# Patient Record
Sex: FEMALE | Race: WHITE | Employment: OTHER | URBAN - METROPOLITAN AREA
[De-identification: names, ages, dates, MRNs, and addresses within clinical notes are randomized per-mention and may not be internally consistent; named-entity substitution may affect disease eponyms.]

---

## 2018-01-08 ENCOUNTER — OFFICE VISIT (OUTPATIENT)
Dept: INTERNAL MEDICINE CLINIC | Age: 68
End: 2018-01-08

## 2018-01-08 VITALS
BODY MASS INDEX: 29.34 KG/M2 | HEIGHT: 72 IN | OXYGEN SATURATION: 96 % | TEMPERATURE: 98.3 F | HEART RATE: 56 BPM | DIASTOLIC BLOOD PRESSURE: 70 MMHG | SYSTOLIC BLOOD PRESSURE: 150 MMHG | RESPIRATION RATE: 20 BRPM | WEIGHT: 216.6 LBS

## 2018-01-08 DIAGNOSIS — I48.19 PERSISTENT ATRIAL FIBRILLATION (HCC): Primary | ICD-10-CM

## 2018-01-08 DIAGNOSIS — Z12.11 COLON CANCER SCREENING: ICD-10-CM

## 2018-01-08 DIAGNOSIS — I10 ESSENTIAL HYPERTENSION: ICD-10-CM

## 2018-01-08 DIAGNOSIS — Z13.220 LIPID SCREENING: ICD-10-CM

## 2018-01-08 DIAGNOSIS — Z11.59 NEED FOR HEPATITIS C SCREENING TEST: ICD-10-CM

## 2018-01-08 NOTE — PROGRESS NOTES
New Patient Evaluation    Maeve Nuñez is a 69 y.o. female. They are here to establish care with the group and me as a primary care provider. she has seen Dr. Jane Soliman in the past (in [place]). The last visit was in February of 2017     She has seen Dr. Jose Shah for what sounds like a PFO. She had a closure device placed in 2009. She was discovered to be in Afib some years later. After two attempts at cardioversion and attempts to medically manage this--she was placed on Eliquis. Stable on this medication. She has white coat syndrome--pressure elevated today. Her GI physician is Dr. Cassi Glass. She has seen him on Sept 25th. She had an issue with diarrhea. Treated by urgent care with cipro/Flagyl. This resolved and has not returned. She had a colonoscopy March of 2014. Had some polyps--5 year follow up. Bone denisity- Normal.   Mammogram last Dec '17 and normal.  Has dense breasts. No physical since May of 2016    Patient Active Problem List    Diagnosis Date Noted    Persistent atrial fibrillation ([address] 01/08/2018    Essential hypertension 01/08/2018     Current Outpatient Prescriptions   Medication Sig Dispense Refill    metoprolol succinate (TOPROL-XL) 50 mg XL tablet Take 1 Tab by mouth daily. 30 Tab 11    losartan (COZAAR) 100 mg tablet Take 100 mg by mouth daily.  apixaban (ELIQUIS) 5 mg tablet Take 5 mg by mouth two (2) times a day. Allergies   Allergen Reactions    Amoxicillin Hives    Doxycycline Rash     No past medical history on file. 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      HX TONSILLECTOMY       Family History   Problem Relation Age of Onset    Hypertension Mother     Alzheimer Mother     Prostate Cancer Father     Diabetes Father     Hypertension Father     Diabetes Sister      typpe 1    Prostate Cancer Brother  Hypertension Brother     Alzheimer Brother      Social History   Substance Use Topics    Smoking status: Former Smoker     Quit date: 1980    Smokeless tobacco: Never Used    Alcohol use Yes      Comment: 7        Health Maintenance   Topic Date Due    Hepatitis C Screening  1950    DTaP/Tdap/Td series (1 - Tdap) 09/05/1969    BREAST CANCER SCRN MAMMOGRAM  09/05/1998    FOBT Q 1 YEAR AGE 50-75  09/05/1998    ZOSTER VACCINE AGE 60>  07/05/2008    GLAUCOMA SCREENING Q2Y  09/05/2013    OSTEOPOROSIS SCREENING (DEXA)  09/05/2013    Pneumococcal 65+ Low/Medium Risk (1 of 2 - PCV13) 09/05/2013    MEDICARE YEARLY EXAM  09/05/2013    Influenza Age 9 to Adult  08/01/2017       Review of Systems   Constitutional: Negative. Respiratory: Negative. Cardiovascular: Negative. Gastrointestinal: Negative. Genitourinary: Negative. Visit Vitals    /70 (BP 1 Location: Right arm, BP Patient Position: Sitting)    Pulse (!) 56    Temp 98.3 °F (36.8 °C) (Oral)    Resp 20    Ht 6' 1\" (1.854 m)    Wt 216 lb 9.6 oz (98.2 kg)    SpO2 96%    BMI 28.58 kg/m2       Physical Exam   Constitutional: She is oriented to person, place, and time. No distress. Neurological: She is alert and oriented to person, place, and time. ASSESSMENT/PLAN    Diagnoses and all orders for this visit:    1. Persistent atrial fibrillation ([address]    2. Colon cancer screening  -     OCCULT BLOOD, IMMUNOASSAY (FIT)    3. Essential hypertension  -     CBC WITH AUTOMATED DIFF  -     METABOLIC PANEL, COMPREHENSIVE    4. Lipid screening  -     LIPID PANEL    5. Need for hepatitis C screening test  -     HEPATITIS C AB      Follow-up Disposition:  Return in about 7 weeks (around 2/26/2018) for Medicare Wellness Visit- 30 minute appointment.    -Discussed with the patient to continue the current plan of care. We will obtain baseline labwork and determine if any adjustments need to be done.   We will also await the records of the previous PCP to ascertain further details of the patient's history. The patient agrees with and understands the plan of care. All questions have been answered.

## 2018-02-21 ENCOUNTER — HOSPITAL ENCOUNTER (OUTPATIENT)
Dept: LAB | Age: 68
Discharge: HOME | End: 2018-02-21
Payer: MEDICARE

## 2018-02-21 PROCEDURE — 36415 COLL VENOUS BLD VENIPUNCTURE: CPT

## 2018-02-21 PROCEDURE — 85025 COMPLETE CBC W/AUTO DIFF WBC: CPT

## 2018-02-21 PROCEDURE — 86803 HEPATITIS C AB TEST: CPT

## 2018-02-21 PROCEDURE — 80061 LIPID PANEL: CPT

## 2018-02-21 PROCEDURE — 80053 COMPREHEN METABOLIC PANEL: CPT

## 2018-02-21 PROCEDURE — 82270 OCCULT BLOOD FECES: CPT

## 2018-02-22 LAB
ALBUMIN SERPL-MCNC: 4.2 G/DL (ref 3.6–4.8)
ALBUMIN/GLOB SERPL: 1.8 {RATIO} (ref 1.2–2.2)
ALP SERPL-CCNC: 74 IU/L (ref 39–117)
ALT SERPL-CCNC: 13 IU/L (ref 0–32)
AST SERPL-CCNC: 18 IU/L (ref 0–40)
BASOPHILS # BLD AUTO: 0 X10E3/UL (ref 0–0.2)
BASOPHILS NFR BLD AUTO: 0 %
BILIRUB SERPL-MCNC: 1.1 MG/DL (ref 0–1.2)
BUN SERPL-MCNC: 19 MG/DL (ref 8–27)
BUN/CREAT SERPL: 20 (ref 12–28)
CALCIUM SERPL-MCNC: 9.5 MG/DL (ref 8.7–10.3)
CHLORIDE SERPL-SCNC: 100 MMOL/L (ref 96–106)
CHOLEST SERPL-MCNC: 184 MG/DL (ref 100–199)
CO2 SERPL-SCNC: 23 MMOL/L (ref 18–29)
CREAT SERPL-MCNC: 0.94 MG/DL (ref 0.57–1)
EOSINOPHIL # BLD AUTO: 0 X10E3/UL (ref 0–0.4)
EOSINOPHIL NFR BLD AUTO: 0 %
ERYTHROCYTE [DISTWIDTH] IN BLOOD BY AUTOMATED COUNT: 13.5 % (ref 12.3–15.4)
GFR SERPLBLD CREATININE-BSD FMLA CKD-EPI: 62 ML/MIN/{1.73_M2}
GFR SERPLBLD CREATININE-BSD FMLA CKD-EPI: 72 ML/MIN/{1.73_M2}
GLOBULIN SER CALC-MCNC: 2.4 G/L (ref 1.5–4.5)
GLUCOSE SERPL-MCNC: 102 MG/DL (ref 65–99)
HCT VFR BLD AUTO: 43.5 % (ref 34–46.6)
HCV AB S/CO SERPL IA: <0.1 S/CO RATIO (ref 0–0.9)
HDLC SERPL-MCNC: 55 MG/DL
HGB BLD-MCNC: 14.7 G/DL (ref 11.1–15.9)
IMM GRANULOCYTES # BLD: 0 X10E3/UL (ref 0–0.1)
IMM GRANULOCYTES NFR BLD: 0 %
LDLC SERPL CALC-MCNC: 108 MG/DL (ref 0–99)
LYMPHOCYTES # BLD AUTO: 1 X10E3/UL (ref 0.7–3.1)
LYMPHOCYTES NFR BLD AUTO: 12 %
MCH RBC QN AUTO: 30.8 PG (ref 26.6–33)
MCHC RBC AUTO-ENTMCNC: 33.8 G/DL (ref 31.5–35.7)
MCV RBC AUTO: 91 FL (ref 79–97)
MONOCYTES # BLD AUTO: 0.5 X10E3/UL (ref 0.1–0.9)
MONOCYTES NFR BLD AUTO: 6 %
NEUTROPHILS # BLD AUTO: 6.9 X10E3/UL (ref 1.4–7)
NEUTROPHILS NFR BLD AUTO: 82 %
PLATELET # BLD AUTO: 180 X10E3/UL (ref 150–379)
POTASSIUM SERPL-SCNC: 4.5 MMOL/L (ref 3.5–5.2)
PROT SERPL-MCNC: 6.6 G/DL (ref 6–8.5)
RBC # BLD AUTO: 4.78 X10E6/UL (ref 3.77–5.28)
SODIUM SERPL-SCNC: 143 MMOL/L (ref 134–144)
TRIGL SERPL-MCNC: 106 MG/DL (ref 0–149)
VLDLC SERPL CALC-MCNC: 21 MG/DL (ref 5–40)
WBC # BLD AUTO: 8.4 X10E3/UL (ref 3.4–10.8)

## 2018-02-22 NOTE — PROGRESS NOTES
Please inform Ms. Royer Collier that her labs are normal with exception of a very mild elevation of cholesterol. Nothing needs to be done further regarding this.

## 2018-02-23 LAB — HEMOCCULT STL QL IA: POSITIVE

## 2018-02-28 ENCOUNTER — OFFICE VISIT (OUTPATIENT)
Dept: INTERNAL MEDICINE CLINIC | Age: 68
End: 2018-02-28

## 2018-02-28 VITALS
HEIGHT: 72 IN | SYSTOLIC BLOOD PRESSURE: 165 MMHG | TEMPERATURE: 98.5 F | OXYGEN SATURATION: 98 % | WEIGHT: 217.2 LBS | RESPIRATION RATE: 20 BRPM | BODY MASS INDEX: 29.42 KG/M2 | DIASTOLIC BLOOD PRESSURE: 80 MMHG | HEART RATE: 107 BPM

## 2018-02-28 DIAGNOSIS — Z13.39 SCREENING FOR ALCOHOLISM: ICD-10-CM

## 2018-02-28 DIAGNOSIS — Z23 NEED FOR TDAP VACCINATION: ICD-10-CM

## 2018-02-28 DIAGNOSIS — Z00.00 INITIAL MEDICARE ANNUAL WELLNESS VISIT: Primary | ICD-10-CM

## 2018-02-28 DIAGNOSIS — Z13.31 SCREENING FOR DEPRESSION: ICD-10-CM

## 2018-02-28 NOTE — PROGRESS NOTES
Dr. Harris Manriquez referred BB, 1950, a 69 y.o. female for a Medicare Annual Wellness Visit (AWV). This is an Initial Medicare Annual Wellness Exam (AWV) (Performed 12 months after IPPE or effective date of Medicare Part B enrollment, Once in a lifetime)    I have reviewed the patient's medical history in detail and updated the computerized patient record. 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  HX TONSILLECTOMY       Current Outpatient Prescriptions   Medication Sig Dispense Refill    metoprolol succinate (TOPROL-XL) 50 mg XL tablet Take 1 Tab by mouth daily. 30 Tab 11    losartan (COZAAR) 100 mg tablet Take 100 mg by mouth daily.  apixaban (ELIQUIS) 5 mg tablet Take 5 mg by mouth two (2) times a day. Allergies   Allergen Reactions    Amoxicillin Hives    Doxycycline Rash     Family History   Problem Relation Age of Onset    Hypertension Mother     Alzheimer Mother     Prostate Cancer Father     Diabetes Father     Hypertension Father     Diabetes Sister      typpe 1    Prostate Cancer Brother     Hypertension Brother     Alzheimer Brother      Social History   Substance Use Topics    Smoking status: Former Smoker     Quit date: 1980    Smokeless tobacco: Never Used    Alcohol use No     Patient Active Problem List   Diagnosis Code    Persistent atrial fibrillation (HCC) I48.1    Essential hypertension I10       Depression Risk Factor Screening:     PHQ over the last two weeks 1/8/2018   Little interest or pleasure in doing things Not at all   Feeling down, depressed or hopeless Not at all   Total Score PHQ 2 0     Alcohol Risk Factor Screening: You do not drink alcohol or very rarely.     Functional Ability and Level of Safety:     Hearing Loss  Hearing is good.    Activities of Daily Living  The home contains: no safety equipment. Patient does total self care     ADL Assessment 2/28/2018   Feeding yourself No Help Needed   Getting from bed to chair No Help Needed   Getting dressed No Help Needed   Bathing or showering No Help Needed   Walk across the room (includes cane/walker) No Help Needed   Using the telphone No Help Needed   Taking your medications No Help Needed   Preparing meals No Help Needed   Managing money (expenses/bills) No Help Needed   Moderately strenuous housework (laundry) No Help Needed   Shopping for personal items (toiletries/medicines) No Help Needed   Shopping for groceries No Help Needed   Driving No Help Needed   Climbing a flight of stairs No Help Needed   Getting to places beyond walking distances No Help Needed       Fall Risk  Fall Risk Assessment, last 12 mths 2/28/2018   Able to walk? Yes   Fall in past 12 months? No       Abuse Screen  Patient is not abused    Cognitive Screening   Evaluation of Cognitive Function:  Has your family/caregiver stated any concerns about your memory: no    Patient Care Team   Patient Care Team:  Dione Leventhal, MD as PCP - General (Internal Medicine)  Jeovanny Hurst MD (Cardiology)  Patito Romero MD (Ophthalmology)  Kaitlynn Dow MD (Gastroenterology)    Assessment/Plan   Education and counseling provided:  Are appropriate based on today's review and evaluation  End-of-Life planning (with patient's consent)  Pneumococcal Vaccine  Influenza Vaccine  Screening Mammography  Colorectal cancer screening tests  Screening for glaucoma  Diabetes screening test   Shingrix  Tdap    Diagnoses and all orders for this visit:    1. Initial Medicare annual wellness visit    2. Screening for alcoholism    3. Screening for depression     Patient did not bring in their medications to the visit.   During the patient interview,  the following was noted:  Patient is unsure if she has allergy to both amoxicillin and doxycycline. She was placed on amoxicillin for several weeks by periodontist.  She took without any problems. Doxycycline was then added and patient developed rash/hives. Screenings (see patient instructions for chart):  Mammogram: done 12/1/17, due for repeat in 10/2018  Colonoscopy: done March 2014 per patient with 5 yr follow-up. FIT done on 2/21/18 with positive result. Dr. Feroz Zee to discuss with patient. Glaucoma screening/Eye exam: done 10/10/17 due for repeat in  DEXA scan: patient states had this done and was normal  Lipid panel: done 2/21/18  Diabetes: done 2/21/18    Immunizations:  Pneumococcal:  Completed, called [facility] to confirm dates  Influenza:  Patient declines  Zoster:  Received Zostavax called [facility] to confirm date. Discussed recommendations for Shingrix  Tdap:  Recommended that patient receive at their pharmacy.     Advanced Care Planning:  Patient brought with her today      Brynda Fleischer, PharmD, [address] [place] [address] Dr. Maintenance Due   Topic Date Due    DTaP/Tdap/Td series (1 - Tdap) 09/05/1969    OSTEOPOROSIS SCREENING (DEXA)  09/05/2013    MEDICARE YEARLY EXAM  09/05/2013

## 2018-02-28 NOTE — MR AVS SNAPSHOT
[address] [place] [address] 
[phone] Patient: Cecilio Wallace MRN: [id number] Visit Information Date & Time Provider Department Dept. Phone Encounter #  
 2/28/2018  2:15 PM Dione Leventhal, MD [facility] Internal Medicine [phone] [id number] Upcoming Health Maintenance Date Due DTaP/Tdap/Td series (1 - Tdap) 9/5/1969 OSTEOPOROSIS SCREENING (DEXA) 9/5/2013 FOBT Q 1 YEAR AGE 50-75 2/21/2019 MEDICARE YEARLY EXAM 3/1/2019 GLAUCOMA SCREENING Q2Y 10/10/2019 BREAST CANCER SCRN MAMMOGRAM 12/1/2019 Allergies as of 2/28/2018  Review Complete On: 2/28/2018 By: Payal Hansen PHARMD  
  
 Severity Noted Reaction Type Reactions Amoxicillin  01/08/2018    Hives Doxycycline  06/01/2016    Rash Current Immunizations  Reviewed on 2/28/2018 Name Date Pneumococcal Conjugate (PCV-13) 6/5/2015 Pneumococcal Polysaccharide (PPSV-23) 6/27/2016 Zoster Vaccine, Live 5/29/2015 Reviewed by Payal Hansen PHARMD on 2/28/2018 at  2:03 PM  
You Were Diagnosed With   
  
 Codes Comments Need for Tdap vaccination    -  Primary ICD-10-CM: S60 ICD-9-CM: V06.1 Initial Medicare annual wellness visit     ICD-10-CM: Z00.00 ICD-9-CM: V70.0 Screening for alcoholism     ICD-10-CM: Z13.89 ICD-9-CM: V79.1 Screening for depression     ICD-10-CM: Z13.89 ICD-9-CM: V79.0 Vitals BP Pulse Temp Resp Height(growth percentile) Weight(growth percentile) 165/80 (!) 107 98.5 °F (36.9 °C) (Oral) 20 6' 1\" (1.854 m) 217 lb 3.2 oz (98.5 kg) SpO2 BMI OB Status Smoking Status 98% 28.66 kg/m2 Postmenopausal Former Smoker Vitals History BMI and BSA Data Body Mass Index Body Surface Area  
 28.66 kg/m 2 2.25 m 2 Preferred Pharmacy Pharmacy Name Phone  [address] [phone] Your Updated Medication List  
  
   
This list is accurate as of 18  3:06 PM.  Always use your most recent med list.  
  
  
  
  
 apixaban 5 mg tablet Commonly known as:  [place] Arm Take 5 mg by mouth two (2) times a day. diph,pertuss(acel),tetanus vac(PF) 2 Lf-(2.5-5-3-5 mcg)-5Lf/0.5 mL Syrg vaccine Commonly known as:  ADACEL(TDAP ADOLESN/ADULT)(PF)  
0.5 mL by IntraMUSCular route once for 1 dose. losartan 100 mg tablet Commonly known as:  COZAAR Take 100 mg by mouth daily. metoprolol succinate 50 mg XL tablet Commonly known as:  TOPROL-XL Take 1 Tab by mouth daily. Prescriptions Printed Refills diph,pertuss,acel,,tetanus vac,PF, (ADACEL,TDAP ADOLESN/ADULT,,PF,) 2 Lf-(2.5-5-3-5 mcg)-5Lf/0.5 mL syrg vaccine 0 Si.5 mL by IntraMUSCular route once for 1 dose. Class: Print Route: IntraMUSCular Patient Instructions Medicare Wellness Visit, Female The best way to live healthy is to have a healthy lifestyle by eating a well-balanced diet, exercising regularly, limiting alcohol and stopping smoking. Regular physical exams and screening tests are another way to keep healthy. Preventive exams provided by your health care provider can find health problems before they become diseases or illnesses. Preventive services including immunizations, screening tests, monitoring and exams can help you take care of your own health. All people over age 72 should have a pneumovax  and and a prevnar shot to prevent pneumonia. These are once in a lifetime unless you and your provider decide differently. All people over 65 should have a yearly flu shot and a tetanus vaccine every 10 years. A bone mass density to screen for osteoporosis or thinning of the bones should be done every 2 years after 65. Screening for diabetes mellitus with a blood sugar test should be done every year. Glaucoma is a disease of the eye due to increased ocular pressure that can lead to blindness and it should be done every year by an eye professional. 
 
Cardiovascular screening tests that check for elevated lipids (fatty part of blood) which can lead to heart disease and strokes should be done every 5 years. Colorectal screening that evaluates for blood or polyps in your colon should be done yearly as a stool test or every five years as a flexible sigmoidoscope or every 10 years as a colonoscopy up to age 76. Breast cancer screening with a mammogram is recommended biennially  for women age 54-69. Screening for cervical cancer with a pap smear and pelvic exam is recommended for women after age 72 years every 2 years up to age 79 or when the provider and patient decide to stop. If there is a history of cervical abnormalities or other increased risk for cancer then the test is recommended yearly. Hepatitis C screening is also recommended for anyone born between 80 through [address]. A shingles vaccine is also recommended once in a lifetime after age 61. Your Medicare Wellness Exam is recommended annually. Here is a list of your current Health Maintenance items with a due date: 
Health Maintenance Due Topic Date Due  
 DTaP/Tdap/Td  (1 - Tdap) 09/05/1969  Bone Density Screening  09/05/2013 [facility] Annual Well Visit  09/05/2013 Medicare Part B Preventive Services Limitations Recommendation Scheduled Bone Mass Measurement 
(age 72 & older, biennial) Requires diagnosis related to osteoporosis or estrogen deficiency. Biennial benefit unless patient has history of long-term glucocorticoid tx or baseline is needed because initial test was by other method A DEXA scan is recommended after you turn 72years of age to determine your risk for osteoporosis Completed Cardiovascular Screening Blood Tests (every 5 years) Total cholesterol, HDL, Triglycerides Order as a panel if possible Last: 2/21/18 Every Year Next: 2/2019 Colorectal Cancer Screening 
-Fecal occult blood test (annual) -Flexible sigmoidoscopy (5y) 
-Screening colonoscopy (10y) -Barium Enema  Last: 
Colonoscopy 2014 with 5 year follow-up 2/21/18: FIT done Every 5-10 years depending on your colonoscopy result starting at age 48 years Discuss with Dr. Radha Minor Diabetes Screening Tests (at least every 3 years, Medicare covers annually or at 6-month intervals for prediabetic patients) Fasting blood sugar (FBS) or glucose tolerance test (GTT) Patient must be diagnosed with one of the following: 
-Hypertension, Dyslipidemia, obesity, previous impaired FBS or GTT 
Or any two of the following: overweight, FH of diabetes, age ? 72, history of gestational diabetes, birth of baby weighing more than 9 pounds Last: 2/21/18 Next: 2/2019 or sooner with routine labwork Glaucoma Screening (no USPSTF recommendation) Diabetes mellitus, family history, , age 48 or over,  American, age 72 or over Last: 10/10/17 Every year Next: 10/2018 Seasonal Influenza Vaccination (annually) Every Fall Patient declines Shingles Vaccination  A Shingrix series (2 shots  by 2-6 months) is recommended once in a lifetime after age 61 Please get this series at your pharmacy once it is covered. Check end of spring Pneumococcal Vaccination (a RSSLBVB-76 and a Pneumovax after 65)  Last: 
Pneumovax: 
6/27/16 Prevnar-13: 6/5/15 Complete Screening Mammography (biennial age 54-69)? Annually (age 36 or over) Last: 12/1/17 Next: 12/2018 Please get a tetanus plus pertussis (whooping cough) vaccine at your pharmacy. This is also known as the Tdap vaccine. Since this vaccine is covered under your prescription benefits, ask the pharmacist to tell you what your copay will be. After you get the vaccine have the pharmacist fax in documentation to the office. Introducing [facility]!    
 Dear KATIUSKA: 
 Thank you for requesting a [employer] account. Our records indicate that you already have an active [employer] account. You can access your account anytime at [email or web address] Did you know that you can access your hospital and ER discharge instructions at any time in [employer]? You can also review all of your test results from your hospital stay or ER visit. Additional Information If you have questions, please visit the Frequently Asked Questions section of the [employer] website at [email or web address] Remember, [employer] is NOT to be used for urgent needs. For medical emergencies, dial 911. Now available from your iPhone and Android! Please provide this summary of care documentation to your next provider. Your primary care clinician is listed as Anthon Runner. If you have any questions after today's visit, please call [phone].

## 2018-02-28 NOTE — PATIENT INSTRUCTIONS
Medicare Wellness Visit, Female    The best way to live healthy is to have a healthy lifestyle by eating a well-balanced diet, exercising regularly, limiting alcohol and stopping smoking. Regular physical exams and screening tests are another way to keep healthy. Preventive exams provided by your health care provider can find health problems before they become diseases or illnesses. Preventive services including immunizations, screening tests, monitoring and exams can help you take care of your own health. All people over age 72 should have a pneumovax  and and a prevnar shot to prevent pneumonia. These are once in a lifetime unless you and your provider decide differently. All people over 65 should have a yearly flu shot and a tetanus vaccine every 10 years. A bone mass density to screen for osteoporosis or thinning of the bones should be done every 2 years after 65. Screening for diabetes mellitus with a blood sugar test should be done every year. Glaucoma is a disease of the eye due to increased ocular pressure that can lead to blindness and it should be done every year by an eye professional.    Cardiovascular screening tests that check for elevated lipids (fatty part of blood) which can lead to heart disease and strokes should be done every 5 years. Colorectal screening that evaluates for blood or polyps in your colon should be done yearly as a stool test or every five years as a flexible sigmoidoscope or every 10 years as a colonoscopy up to age 76. Breast cancer screening with a mammogram is recommended biennially  for women age 54-69. Screening for cervical cancer with a pap smear and pelvic exam is recommended for women after age 72 years every 2 years up to age 79 or when the provider and patient decide to stop. If there is a history of cervical abnormalities or other increased risk for cancer then the test is recommended yearly.     Hepatitis C screening is also recommended for anyone born between 80 through [address]. A shingles vaccine is also recommended once in a lifetime after age 61. Your Medicare Wellness Exam is recommended annually. Here is a list of your current Health Maintenance items with a due date:  Health Maintenance Due   Topic Date Due    DTaP/Tdap/Td  (1 - Tdap) 09/05/1969    Bone Density Screening  09/05/2013    Annual Well Visit  09/05/2013     Medicare Part B Preventive Services Limitations Recommendation Scheduled   Bone Mass Measurement  (age 72 & older, biennial) Requires diagnosis related to osteoporosis or estrogen deficiency. Biennial benefit unless patient has history of long-term glucocorticoid tx or baseline is needed because initial test was by other method A DEXA scan is recommended after you turn 72years of age to determine your risk for osteoporosis Completed    Cardiovascular Screening Blood Tests (every 5 years)  Total cholesterol, HDL, Triglycerides Order as a panel if possible Last: 2/21/18    Every Year Next: 2/2019   Colorectal Cancer Screening  -Fecal occult blood test (annual)  -Flexible sigmoidoscopy (5y)  -Screening colonoscopy (10y)  -Barium Enema  Last:  Colonoscopy 2014 with 5 year follow-up    2/21/18: FIT done    Every 5-10 years depending on your colonoscopy result starting at age 48 years Discuss with Dr. Chung Sanchez   Diabetes Screening Tests (at least every 3 years, Medicare covers annually or at 6-month intervals for prediabetic patients)    Fasting blood sugar (FBS) or glucose tolerance test (GTT) Patient must be diagnosed with one of the following:  -Hypertension, Dyslipidemia, obesity, previous impaired FBS or GTT  Or any two of the following: overweight, FH of diabetes, age ? 72, history of gestational diabetes, birth of baby weighing more than 9 pounds Last: 2/21/18     Next: 2/2019 or sooner with routine labwork   Glaucoma Screening (no USPSTF recommendation) Diabetes mellitus, family history, , age 48 or over,  American, age 72 or over Last: 10/10/17    Every year Next: 10/2018   Seasonal Influenza Vaccination (annually)    Every Fall Patient declines   Shingles Vaccination  A Shingrix series (2 shots  by 2-6 months) is recommended once in a lifetime after age 61 Please get this series at your pharmacy once it is covered. Check end of spring   Pneumococcal Vaccination (a Prevnar-13 and a Pneumovax after 65)  Last:  Pneumovax:  6/27/16    Prevnar-13: 6/5/15 Complete   Screening Mammography (biennial age 54-69)? Annually (age 36 or over) Last: 12/1/17 Next: 12/2018     Please get a tetanus plus pertussis (whooping cough) vaccine at your pharmacy. This is also known as the Tdap vaccine. Since this vaccine is covered under your prescription benefits, ask the pharmacist to tell you what your copay will be. After you get the vaccine have the pharmacist fax in documentation to the office.

## 2018-03-05 ENCOUNTER — DOCUMENTATION ONLY (OUTPATIENT)
Dept: INTERNAL MEDICINE CLINIC | Age: 68
End: 2018-03-05

## 2018-03-06 NOTE — PROGRESS NOTES
Follow Up Visit    Rayna Guzman is a 69 y.o. female. she presents for Annual Wellness Visit (initial visit)    The patient reports feeling well today. She has continued medications for her atrial fibrillation. She is on both Toprol-XL as well as Eliquis. Tolerating both medications without difficulty. She is also on losartan and has a relatively well-controlled blood pressure. Reviewed notations of the Pharm. D. Agree with notes as documented. Patient Active Problem List   Diagnosis Code    Persistent atrial fibrillation ([facility] [address] I48.1    Essential hypertension I10         Prior to Admission medications    Medication Sig Start Date End Date Taking? Authorizing Provider   metoprolol succinate (TOPROL-XL) 50 mg XL tablet Take 1 Tab by mouth daily. 6/24/16  Yes Vidya Hall MD   losartan (COZAAR) 100 mg tablet Take 100 mg by mouth daily. Yes Historical Provider   apixaban (ELIQUIS) 5 mg tablet Take 5 mg by mouth two (2) times a day. Yes Historical Provider         Health Maintenance   Topic Date Due    OSTEOPOROSIS SCREENING (DEXA)  09/05/2013    FOBT Q 1 YEAR AGE 50-75  02/21/2019    MEDICARE YEARLY EXAM  03/01/2019    GLAUCOMA SCREENING Q2Y  10/10/2019    BREAST CANCER SCRN MAMMOGRAM  12/01/2019    DTaP/Tdap/Td series (2 - Td) 03/03/2028    Hepatitis C Screening  Completed    ZOSTER VACCINE AGE 60>  Completed    Pneumococcal 65+ Low/Medium Risk  Completed    Influenza Age 5 to Adult  Addressed       Review of Systems   Constitutional: Negative. Respiratory: Negative. Cardiovascular: Negative. Gastrointestinal: Negative. Visit Vitals    /80    Pulse (!) 107    Temp 98.5 °F (36.9 °C) (Oral)    Resp 20    Ht 6' 1\" (1.854 m)    Wt 217 lb 3.2 oz (98.5 kg)    SpO2 98%    BMI 28.66 kg/m2       Physical Exam   Constitutional: She is oriented to person, place, and time. No distress. Cardiovascular: Normal rate and regular rhythm.     Pulmonary/Chest: Effort normal and breath sounds normal. She has no wheezes. Neurological: She is alert and oriented to person, place, and time. ASSESSMENT/PLAN    Diagnoses and all orders for this visit:    1. Initial Medicare annual wellness visit    2. Screening for alcoholism    3. Screening for depression    4. Need for Tdap vaccination  -     diph,pertuss,acel,,tetanus vac,PF, (ADACEL,TDAP ADOLESN/ADULT,,PF,) 2 Lf-(2.5-5-3-5 mcg)-5Lf/0.5 mL syrg vaccine; 0.5 mL by IntraMUSCular route once for 1 dose. Follow-up Disposition:  Return in about 6 months (around 8/28/2018) for Follow up.

## 2018-08-28 ENCOUNTER — OFFICE VISIT (OUTPATIENT)
Dept: INTERNAL MEDICINE CLINIC | Age: 68
End: 2018-08-28

## 2018-08-28 VITALS
HEIGHT: 72 IN | OXYGEN SATURATION: 98 % | DIASTOLIC BLOOD PRESSURE: 99 MMHG | BODY MASS INDEX: 29.34 KG/M2 | TEMPERATURE: 98.6 F | WEIGHT: 216.6 LBS | HEART RATE: 94 BPM | SYSTOLIC BLOOD PRESSURE: 155 MMHG | RESPIRATION RATE: 17 BRPM

## 2018-08-28 DIAGNOSIS — I10 ESSENTIAL HYPERTENSION: Primary | ICD-10-CM

## 2018-08-28 NOTE — PROGRESS NOTES
Follow Up Visit    Tiffanie Watkins is a 69 y.o. female. she presents for Hypertension    Cardiovascular Review  The patient has hypertension. She reports taking medications as instructed, no medication side effects noted. Diet and Lifestyle: generally follows a low fat low cholesterol diet, generally follows a low sodium diet. Lab review: most recent lipid panel reviewed, showing LDL result meets goal.        Patient Active Problem List   Diagnosis Code    Pers[facility] ([facility] I48.1    Essential hypertension I10         Prior to Admission medications    Medication Sig Start Date End Date Taking? Authorizing Provider   metoprolol succinate (TOPROL-XL) 50 mg XL tablet Take 1 Tab by mouth daily. 6/24/16  Yes Sarah Sinha MD   losartan (COZAAR) 100 mg tablet Take 100 mg by mouth daily. Yes Historical Provider   apixaban (ELIQUIS) 5 mg tablet Take 5 mg by mouth two (2) times a day. Yes Historical Provider         Health Maintenance   Topic Date Due    Bone Densitometry (Dexa) Screening  09/05/2013    Influenza Age 5 to Adult  08/01/2018    FOBT Q 1 YEAR AGE 50-75  02/21/2019    MEDICARE YEARLY EXAM  03/01/2019    GLAUCOMA SCREENING Q2Y  10/10/2019    BREAST CANCER SCRN MAMMOGRAM  12/01/2019    DTaP/Tdap/Td series (2 - Td) 03/03/2028    Hepatitis C Screening  Completed    ZOSTER VACCINE AGE 60>  Completed    Pneumococcal 65+ Low/Medium Risk  Completed       Review of Systems   Constitutional: Negative. Respiratory: Negative. Cardiovascular: Negative. Gastrointestinal: Negative. Visit Vitals    BP (!) 155/99 (BP 1 Location: Right arm, BP Patient Position: Sitting)    Pulse 94    Temp 98.6 °F (37 °C) (Oral)    Resp 17    Ht 6' 1\" (1.854 m)    Wt 216 lb 9.6 oz (98.2 kg)    SpO2 98%    BMI 28.58 kg/m2       Physical Exam   Constitutional: She appears well-developed and well-nourished. Cardiovascular: Normal rate and regular rhythm.     Pulmonary/Chest: Effort normal and breath sounds normal.         ASSESSMENT/PLAN    Diagnoses and all orders for this visit:    1. Essential hypertension      Follow-up Disposition:  Return in about 6 months (around 2/28/2019) for Medicare Wellness Visit- 30 minute appointment.

## 2018-11-09 DIAGNOSIS — Z13.820 SCREENING FOR OSTEOPOROSIS: Primary | ICD-10-CM

## 2019-02-05 ENCOUNTER — TELEPHONE (OUTPATIENT)
Dept: INTERNAL MEDICINE CLINIC | Age: 69
End: 2019-02-05

## 2019-02-05 DIAGNOSIS — Z00.00 ROUTINE GENERAL MEDICAL EXAMINATION AT A HEALTH CARE FACILITY: Primary | ICD-10-CM

## 2019-02-25 ENCOUNTER — HOSPITAL ENCOUNTER (OUTPATIENT)
Dept: LAB | Age: 69
Discharge: HOME | End: 2019-02-25
Payer: MEDICARE

## 2019-02-25 PROCEDURE — 80061 LIPID PANEL: CPT

## 2019-02-25 PROCEDURE — 85025 COMPLETE CBC W/AUTO DIFF WBC: CPT

## 2019-02-25 PROCEDURE — 36415 COLL VENOUS BLD VENIPUNCTURE: CPT

## 2019-02-25 PROCEDURE — 80053 COMPREHEN METABOLIC PANEL: CPT

## 2019-02-26 LAB
ALBUMIN SERPL-MCNC: 4.3 G/DL (ref 3.5–4.8)
ALBUMIN/GLOB SERPL: 1.5 {RATIO} (ref 1.2–2.2)
ALP SERPL-CCNC: 72 IU/L (ref 39–117)
ALT SERPL-CCNC: 15 IU/L (ref 0–32)
AST SERPL-CCNC: 20 IU/L (ref 0–40)
BASOPHILS # BLD AUTO: 0 X10E3/UL (ref 0–0.2)
BASOPHILS NFR BLD AUTO: 1 %
BILIRUB SERPL-MCNC: 1 MG/DL (ref 0–1.2)
BUN SERPL-MCNC: 17 MG/DL (ref 8–27)
BUN/CREAT SERPL: 19 (ref 12–28)
CALCIUM SERPL-MCNC: 9.7 MG/DL (ref 8.7–10.3)
CHLORIDE SERPL-SCNC: 100 MMOL/L (ref 96–106)
CHOLEST SERPL-MCNC: 181 MG/DL (ref 100–199)
CO2 SERPL-SCNC: 25 MMOL/L (ref 20–29)
CREAT SERPL-MCNC: 0.89 MG/DL (ref 0.57–1)
EOSINOPHIL # BLD AUTO: 0 X10E3/UL (ref 0–0.4)
EOSINOPHIL NFR BLD AUTO: 1 %
ERYTHROCYTE [DISTWIDTH] IN BLOOD BY AUTOMATED COUNT: 13.8 % (ref 12.3–15.4)
GLOBULIN SER CALC-MCNC: 2.8 G/DL (ref 1.5–4.5)
GLUCOSE SERPL-MCNC: 98 MG/DL (ref 65–99)
HCT VFR BLD AUTO: 44 % (ref 34–46.6)
HDLC SERPL-MCNC: 58 MG/DL
HGB BLD-MCNC: 15.3 G/DL (ref 11.1–15.9)
IMM GRANULOCYTES # BLD AUTO: 0 X10E3/UL (ref 0–0.1)
IMM GRANULOCYTES NFR BLD AUTO: 0 %
LDLC SERPL CALC-MCNC: 104 MG/DL (ref 0–99)
LYMPHOCYTES # BLD AUTO: 1.2 X10E3/UL (ref 0.7–3.1)
LYMPHOCYTES NFR BLD AUTO: 18 %
MCH RBC QN AUTO: 30.9 PG (ref 26.6–33)
MCHC RBC AUTO-ENTMCNC: 34.8 G/DL (ref 31.5–35.7)
MCV RBC AUTO: 89 FL (ref 79–97)
MONOCYTES # BLD AUTO: 0.5 X10E3/UL (ref 0.1–0.9)
MONOCYTES NFR BLD AUTO: 7 %
NEUTROPHILS # BLD AUTO: 4.9 X10E3/UL (ref 1.4–7)
NEUTROPHILS NFR BLD AUTO: 73 %
PLATELET # BLD AUTO: 166 X10E3/UL (ref 150–379)
POTASSIUM SERPL-SCNC: 4.4 MMOL/L (ref 3.5–5.2)
PROT SERPL-MCNC: 7.1 G/DL (ref 6–8.5)
RBC # BLD AUTO: 4.95 X10E6/UL (ref 3.77–5.28)
SODIUM SERPL-SCNC: 140 MMOL/L (ref 134–144)
TRIGL SERPL-MCNC: 97 MG/DL (ref 0–149)
VLDLC SERPL CALC-MCNC: 19 MG/DL (ref 5–40)
WBC # BLD AUTO: 6.6 X10E3/UL (ref 3.4–10.8)

## 2019-03-04 ENCOUNTER — OFFICE VISIT (OUTPATIENT)
Dept: INTERNAL MEDICINE CLINIC | Age: 69
End: 2019-03-04

## 2019-03-04 VITALS
HEIGHT: 72 IN | SYSTOLIC BLOOD PRESSURE: 150 MMHG | RESPIRATION RATE: 18 BRPM | TEMPERATURE: 98.4 F | DIASTOLIC BLOOD PRESSURE: 90 MMHG | HEART RATE: 96 BPM | BODY MASS INDEX: 29.23 KG/M2 | WEIGHT: 215.8 LBS | OXYGEN SATURATION: 98 %

## 2019-03-04 DIAGNOSIS — I48.19 PERSISTENT ATRIAL FIBRILLATION (HCC): ICD-10-CM

## 2019-03-04 DIAGNOSIS — Z00.00 MEDICARE ANNUAL WELLNESS VISIT, SUBSEQUENT: Primary | ICD-10-CM

## 2019-03-04 DIAGNOSIS — Z23 ENCOUNTER FOR IMMUNIZATION: ICD-10-CM

## 2019-03-04 DIAGNOSIS — I10 ESSENTIAL HYPERTENSION: ICD-10-CM

## 2019-03-04 RX ORDER — LANOLIN ALCOHOL/MO/W.PET/CERES
CREAM (GRAM) TOPICAL
COMMUNITY
End: 2020-03-05

## 2019-03-04 NOTE — PROGRESS NOTES
This is a Subsequent Medicare Annual Wellness Visit providing Personalized Prevention Plan Services (PPPS) (Performed 12 months after initial AWV and PPPS ) I have reviewed the patient's medical history in detail and updated the computerized patient record. The patient has followed up with cardiology. She had an echo--routine. No issues noted that she is aware of. Awaiting results Eyes are stable. She had the pressures checked. No issues. She has had an evaluation with GI. She had blood in the stool on a past.    Colonoscopy was done and is normal.  
 
History Past Medical History:  
Diagnosis Date  Arrhythmia  Hypertension Past Surgical History:  
Procedure Laterality Date  HX APPENDECTOMY  HX CHOLECYSTECTOMY  HX OTHER SURGICAL    
 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  HX TONSILLECTOMY Current Outpatient Medications Medication Sig Dispense Refill  melatonin 3 mg tablet Take  by mouth.  metoprolol succinate (TOPROL-XL) 50 mg XL tablet Take 1 Tab by mouth daily. 30 Tab 11  
 losartan (COZAAR) 100 mg tablet Take 100 mg by mouth daily.  apixaban (ELIQUIS) 5 mg tablet Take 5 mg by mouth two (2) times a day. Allergies Allergen Reactions  Amoxicillin Hives  Doxycycline Rash Family History Problem Relation Age of Onset  Hypertension Mother  Alzheimer Mother  Prostate Cancer Father  Diabetes Father  Hypertension Father  Diabetes Sister   
     typpe 1  
 Prostate Cancer Brother  Hypertension Brother  Alzheimer Brother Social History Tobacco Use  Smoking status: Former Smoker Last attempt to quit: 1980 Years since quittin.1  Smokeless tobacco: Never Used Substance Use Topics  Alcohol use: No  
 
Patient Active Problem List  
Diagnosis Code  Persistent atrial fibrillation (HCC) I48.1  Essential hypertension I10 Depression Risk Factor Screening: 3 most recent PHQ Screens 3/4/2019 Little interest or pleasure in doing things Not at all Feeling down, depressed, irritable, or hopeless Not at all Total Score PHQ 2 0 Alcohol Risk Factor Screening: You do not drink alcohol or very rarely. Functional Ability and Level of Safety:  
 
Hearing Loss Hearing is good. Activities of Daily Living Self-care. Requires assistance with: no ADLs Fall Risk Fall Risk Assessment, last 12 mths 3/4/2019 Able to walk? Yes Fall in past 12 months? No  
 
Abuse Screen Patient is not abused Review of Systems A comprehensive review of systems was negative except for that written in the HPI. Physical Examination Evaluation of Cognitive Function: 
Mood/affect:  happy Appearance: age appropriate Family member/caregiver input: none Visit Vitals /90 (BP 1 Location: Right arm, BP Patient Position: Sitting) Pulse 96 Temp 98.4 °F (36.9 °C) (Oral) Resp 18 Ht 6' 1\" (1.854 m) Wt 215 lb 12.8 oz (97.9 kg) SpO2 98% BMI 28.47 kg/m² General appearance: alert, cooperative, no distress, appears stated age Lungs: clear to auscultation bilaterally Heart: irregularly irregular rhythm Patient Care Team: 
Heather Cho MD as PCP - General (Internal Medicine) Kartik Ponce MD (Cardiology) Jhon Holland MD (Ophthalmology) Ruddy Giordano MD (Gastroenterology) Advice/Referrals/Counseling Education and counseling provided: 
Are appropriate based on today's review and evaluation Assessment/Plan Diagnoses and all orders for this visit: 
 
1. Medicare annual wellness visit, subsequent 2. Encounter for immunization 
-     varicella-zoster recombinant, PF, (SHINGRIX, PF,) 50 mcg/0.5 mL susr injection; 0.5 mL by IntraMUSCular route once for 1 dose. 3. Persistent atrial fibrillation ([address] 4. Essential hypertension Follow-up Disposition: Return in about 6 months (around 9/4/2019), or if symptoms worsen or fail to improve. Minor Grayson

## 2020-02-19 DIAGNOSIS — I10 ESSENTIAL HYPERTENSION: ICD-10-CM

## 2020-02-19 DIAGNOSIS — Z13.220 LIPID SCREENING: Primary | ICD-10-CM

## 2020-02-25 ENCOUNTER — HOSPITAL ENCOUNTER (OUTPATIENT)
Dept: LAB | Age: 70
Discharge: HOME | End: 2020-02-25
Payer: MEDICARE

## 2020-02-25 PROCEDURE — 85025 COMPLETE CBC W/AUTO DIFF WBC: CPT

## 2020-02-25 PROCEDURE — 36415 COLL VENOUS BLD VENIPUNCTURE: CPT

## 2020-02-25 PROCEDURE — 80053 COMPREHEN METABOLIC PANEL: CPT

## 2020-02-25 PROCEDURE — 80061 LIPID PANEL: CPT

## 2020-02-26 LAB
ALBUMIN SERPL-MCNC: 4.3 G/DL (ref 3.7–4.7)
ALBUMIN/GLOB SERPL: 1.9 {RATIO} (ref 1.2–2.2)
ALP SERPL-CCNC: 75 IU/L (ref 39–117)
ALT SERPL-CCNC: 13 IU/L (ref 0–32)
AST SERPL-CCNC: 19 IU/L (ref 0–40)
BASOPHILS # BLD AUTO: 0 X10E3/UL (ref 0–0.2)
BASOPHILS NFR BLD AUTO: 1 %
BILIRUB SERPL-MCNC: 0.8 MG/DL (ref 0–1.2)
BUN SERPL-MCNC: 21 MG/DL (ref 8–27)
BUN/CREAT SERPL: 23 (ref 12–28)
CALCIUM SERPL-MCNC: 9.3 MG/DL (ref 8.7–10.3)
CHLORIDE SERPL-SCNC: 102 MMOL/L (ref 96–106)
CHOLEST SERPL-MCNC: 177 MG/DL (ref 100–199)
CO2 SERPL-SCNC: 23 MMOL/L (ref 20–29)
CREAT SERPL-MCNC: 0.92 MG/DL (ref 0.57–1)
EOSINOPHIL # BLD AUTO: 0 X10E3/UL (ref 0–0.4)
EOSINOPHIL NFR BLD AUTO: 1 %
ERYTHROCYTE [DISTWIDTH] IN BLOOD BY AUTOMATED COUNT: 12.8 % (ref 11.7–15.4)
GLOBULIN SER CALC-MCNC: 2.3 G/DL (ref 1.5–4.5)
GLUCOSE SERPL-MCNC: 101 MG/DL (ref 65–99)
HCT VFR BLD AUTO: 44.3 % (ref 34–46.6)
HDLC SERPL-MCNC: 59 MG/DL
HGB BLD-MCNC: 15.1 G/DL (ref 11.1–15.9)
IMM GRANULOCYTES # BLD AUTO: 0 X10E3/UL (ref 0–0.1)
IMM GRANULOCYTES NFR BLD AUTO: 0 %
LDLC SERPL CALC-MCNC: 101 MG/DL (ref 0–99)
LYMPHOCYTES # BLD AUTO: 1.2 X10E3/UL (ref 0.7–3.1)
LYMPHOCYTES NFR BLD AUTO: 18 %
MCH RBC QN AUTO: 30.6 PG (ref 26.6–33)
MCHC RBC AUTO-ENTMCNC: 34.1 G/DL (ref 31.5–35.7)
MCV RBC AUTO: 90 FL (ref 79–97)
MONOCYTES # BLD AUTO: 0.4 X10E3/UL (ref 0.1–0.9)
MONOCYTES NFR BLD AUTO: 7 %
NEUTROPHILS # BLD AUTO: 4.9 X10E3/UL (ref 1.4–7)
NEUTROPHILS NFR BLD AUTO: 73 %
PLATELET # BLD AUTO: 168 X10E3/UL (ref 150–450)
POTASSIUM SERPL-SCNC: 4.3 MMOL/L (ref 3.5–5.2)
PROT SERPL-MCNC: 6.6 G/DL (ref 6–8.5)
RBC # BLD AUTO: 4.93 X10E6/UL (ref 3.77–5.28)
SODIUM SERPL-SCNC: 140 MMOL/L (ref 134–144)
TRIGL SERPL-MCNC: 83 MG/DL (ref 0–149)
VLDLC SERPL CALC-MCNC: 17 MG/DL (ref 5–40)
WBC # BLD AUTO: 6.6 X10E3/UL (ref 3.4–10.8)

## 2020-03-05 ENCOUNTER — OFFICE VISIT (OUTPATIENT)
Dept: INTERNAL MEDICINE CLINIC | Age: 70
End: 2020-03-05

## 2020-03-05 VITALS
OXYGEN SATURATION: 98 % | BODY MASS INDEX: 30.15 KG/M2 | HEART RATE: 92 BPM | RESPIRATION RATE: 16 BRPM | DIASTOLIC BLOOD PRESSURE: 62 MMHG | HEIGHT: 70 IN | WEIGHT: 210.6 LBS | TEMPERATURE: 97.3 F | SYSTOLIC BLOOD PRESSURE: 160 MMHG

## 2020-03-05 DIAGNOSIS — Z00.00 MEDICARE ANNUAL WELLNESS VISIT, SUBSEQUENT: Primary | ICD-10-CM

## 2020-03-05 DIAGNOSIS — I10 ESSENTIAL HYPERTENSION: ICD-10-CM

## 2020-03-05 DIAGNOSIS — I48.19 PERSISTENT ATRIAL FIBRILLATION (HCC): ICD-10-CM

## 2020-03-05 NOTE — PATIENT INSTRUCTIONS
Medicare Wellness Visit, Female The best way to live healthy is to have a lifestyle where you eat a well-balanced diet, exercise regularly, limit alcohol use, and quit all forms of tobacco/nicotine, if applicable. Regular preventive services are another way to keep healthy. Preventive services (vaccines, screening tests, monitoring & exams) can help personalize your care plan, which helps you manage your own care. Screening tests can find health problems at the earliest stages, when they are easiest to treat. [facility] follows the current, evidence-based guidelines published by the [facility] ([facility]) when recommending preventive services for our patients. Because we follow these guidelines, sometimes recommendations change over time as research supports it. (For example, mammograms used to be recommended annually. Even though Medicare will still pay for an annual mammogram, the newer guidelines recommend a mammogram every two years for women of average risk). Of course, you and your doctor may decide to screen more often for some diseases, based on your risk and your co-morbidities (chronic disease you are already diagnosed with). Preventive services for you include: - Medicare offers their members a free annual wellness visit, which is time for you and your primary care provider to discuss and plan for your preventive service needs. Take advantage of this benefit every year! 
-All adults over the age of 72 should receive the recommended pneumonia vaccines. Current USPSTF guidelines recommend a series of two vaccines for the best pneumonia protection.  
-All adults should have a flu vaccine yearly and a tetanus vaccine every 10 years.  
-All adults age 48 and older should receive the shingles vaccines (series of two vaccines). -All adults age 38-68 who are overweight should have a diabetes screening test once every three years. -All adults born between [birth year] and 1965 should be screened once for Hepatitis C. 
-Other screening tests and preventive services for persons with diabetes include: an eye exam to screen for diabetic retinopathy, a kidney function test, a foot exam, and stricter control over your cholesterol.  
-Cardiovascular screening for adults with routine risk involves an electrocardiogram (ECG) at intervals determined by your doctor.  
-Colorectal cancer screenings should be done for adults age 54-65 with no increased risk factors for colorectal cancer. There are a number of acceptable methods of screening for this type of cancer. Each test has its own benefits and drawbacks. Discuss with your doctor what is most appropriate for you during your annual wellness visit. The different tests include: colonoscopy (considered the best screening method), a fecal occult blood test, a fecal DNA test, and sigmoidoscopy. 
 
-A bone mass density test is recommended when a woman turns 65 to screen for osteoporosis. This test is only recommended one time, as a screening. Some providers will use this same test as a disease monitoring tool if you already have osteoporosis. -Breast cancer screenings are recommended every other year for women of normal risk, age 54-69. 
-Cervical cancer screenings for women over age 72 are only recommended with certain risk factors. Here is a list of your current Health Maintenance items (your personalized list of preventive services) with a due date: 
Health Maintenance Due Topic Date Due  Shingles Vaccine (1 of 2) 09/05/1998  Flu Vaccine  08/01/2019  Glaucoma Screening   10/10/2019 [address] [place] [address] Annual Well Visit  03/04/2020

## 2020-03-05 NOTE — PROGRESS NOTES
This is the Subsequent Medicare Annual Wellness Exam, performed 12 months or more after the Initial AWV or the last Subsequent AWV    I have reviewed the patient's medical history in detail and updated the computerized patient record. The patient has seen cardiology. Doing well. 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  HX TONSILLECTOMY       Current Outpatient Medications   Medication Sig Dispense Refill    metoprolol succinate (TOPROL-XL) 50 mg XL tablet Take 1 Tab by mouth daily. 30 Tab 11    losartan (COZAAR) 100 mg tablet Take 100 mg by mouth daily.  apixaban (ELIQUIS) 5 mg tablet Take 5 mg by mouth two (2) times a day.        Allergies   Allergen Reactions    Amoxicillin Hives    Doxycycline Rash       Family History   Problem Relation Age of Onset    Hypertension Mother     Alzheimer Mother     Prostate Cancer Father     Diabetes Father     Hypertension Father     Diabetes Sister         typpe 1    Prostate Cancer Brother     Hypertension Brother     Alzheimer Brother      Social History     Tobacco Use    Smoking status: Former Smoker     Last attempt to quit: 1980     Years since quittin.2    Smokeless tobacco: Never Used   Substance Use Topics    Alcohol use: No       Depression Risk Factor Screening:     3 most recent PHQ Screens 3/5/2020   Little interest or pleasure in doing things Not at all   Feeling down, depressed, irritable, or hopeless Not at all   Total Score PHQ 2 0       Alcohol Risk Factor Screening:   Do you average 1 drink per night or more than 7 drinks a week:  No    On any one occasion in the past three months have you have had more than 3 drinks containing alcohol:  No      Functional Ability and Level of Safety:   Hearing: Hearing is good. Activities of Daily Living: The home contains: no safety equipment. Patient does total self care    Ambulation: with no difficulty    Fall Risk:  Fall Risk Assessment, last 12 mths 3/5/2020   Able to walk? Yes   Fall in past 12 months? No       Abuse Screen:  Patient is not abused    Cognitive Screening   Has your family/caregiver stated any concerns about your memory: no      Patient Care Team   Patient Care Team:  Nicholas Mondragon MD as PCP - General (Internal Medicine)  Nicholas Mondragon MD as PCP - [facility] Empaneled Provider  Rima Xiao MD (Cardiology)  Lina Merritt MD (Ophthalmology)  Stephanie Capps MD (Gastroenterology)    Assessment/Plan   Education and counseling provided:  Are appropriate based on today's review and evaluation    Diagnoses and all orders for this visit:    1. Medicare annual wellness visit, subsequent    2. Persistent atrial fibrillation    3.  Essential hypertension        Health Maintenance Due   Topic Date Due    Shingrix Vaccine Age 49> (1 of 2) 09/05/1998    Influenza Age 5 to Adult  08/01/2019    GLAUCOMA SCREENING Q2Y  10/10/2019    Medicare Yearly Exam  03/04/2020

## 2020-04-08 ENCOUNTER — APPOINTMENT (OUTPATIENT)
Dept: CT IMAGING | Age: 70
End: 2020-04-08
Attending: EMERGENCY MEDICINE
Payer: MEDICARE

## 2020-04-08 ENCOUNTER — HOSPITAL ENCOUNTER (EMERGENCY)
Age: 70
Discharge: HOME | End: 2020-04-08
Attending: EMERGENCY MEDICINE
Payer: MEDICARE

## 2020-04-08 ENCOUNTER — APPOINTMENT (OUTPATIENT)
Dept: GENERAL RADIOLOGY | Age: 70
End: 2020-04-08
Attending: EMERGENCY MEDICINE
Payer: MEDICARE

## 2020-04-08 VITALS
DIASTOLIC BLOOD PRESSURE: 61 MMHG | TEMPERATURE: 98.2 F | BODY MASS INDEX: 29.32 KG/M2 | SYSTOLIC BLOOD PRESSURE: 152 MMHG | RESPIRATION RATE: 15 BRPM | OXYGEN SATURATION: 94 % | HEIGHT: 71 IN | HEART RATE: 101 BPM | WEIGHT: 209.44 LBS

## 2020-04-08 DIAGNOSIS — S00.81XA ABRASION OF FACE, INITIAL ENCOUNTER: ICD-10-CM

## 2020-04-08 DIAGNOSIS — S01.81XA FACIAL LACERATION, INITIAL ENCOUNTER: ICD-10-CM
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
## 2020-04-08 DIAGNOSIS — S09.90XA CLOSED HEAD INJURY, INITIAL ENCOUNTER: Primary | ICD-10-CM

## 2020-04-08 PROCEDURE — 70450 CT HEAD/BRAIN W/O DYE: CPT

## 2020-04-08 PROCEDURE — 73110 X-RAY EXAM OF WRIST: CPT

## 2020-04-08 PROCEDURE — 72125 CT NECK SPINE W/O DYE: CPT

## 2020-04-08 PROCEDURE — 99284 EMERGENCY DEPT VISIT MOD MDM: CPT

## 2020-04-08 PROCEDURE — 75810000293 HC SIMP/SUPERF WND  RPR

## 2020-04-08 PROCEDURE — 77030010507 HC ADH SKN DERMBND J&J -B

## 2020-04-08 NOTE — ED PROVIDER NOTES
Within the past hour, this patient tripped in a parking lot and fell forward. She struck her forehead and face on the tarmac. No loss of consciousness or nausea/vomiting. No focal numbness or weakness. No headache. A little bit of facial soreness. Last tetanus vaccination was within 5 years, 2 years ago. She is compliant with Eliquis that she takes for atrial fibrillation. No severe bleeding today. No dental pain. No recent fever, cough, cold symptoms or URI symptoms. No alcohol today. No neck pain. No vomiting. No focal weakness/numbness. She used the palm of her left hand to brace her fall. No injuries to her knees. old chart reviewed: Last PCP visit was March 5 of this year for her annual visit. It was documented then that she had persistent atrial fibrillation. She is on Eliquis as well as an ARB and beta-blocker. 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      HX TONSILLECTOMY           Family History:   Problem Relation Age of Onset    Hypertension Mother     Alzheimer Mother     Prostate Cancer Father     Diabetes Father     Hypertension Father     Diabetes Sister         typpe 1    Prostate Cancer Brother     Hypertension Brother     Alzheimer Brother        Social History     Socioeconomic History    Marital status:      Spouse name: Not on file    Number of children: Not on file    Years of education: Not on file    Highest education level: Not on file   Occupational History    Not on file   Social Needs    Financial resource strain: Not on file    Food insecurity     Worry: Not on file     Inability: Not on file    Transportation needs     Medical: Not on file     Non-medical: Not on file   Tobacco Use    Smoking status: Former Smoker     Last attempt to quit: 1980     Years since quittin.2    Smokeless tobacco: Never Used   Substance and Sexual Activity    Alcohol use: No    Drug use: No    Sexual activity: Never   Lifestyle    Physical activity     Days per week: Not on file     Minutes per session: Not on file    Stress: Not on file   Relationships    Social connections     Talks on phone: Not on file     Gets together: Not on file     Attends Rastafari service: Not on file     Active member of club or organization: Not on file     Attends meetings of clubs or organizations: Not on file     Relationship status: Not on file    Intimate partner violence     Fear of current or ex partner: Not on file     Emotionally abused: Not on file     Physically abused: Not on file     Forced sexual activity: Not on file   Other Topics Concern    Not on file   Social History Narrative    Not on file         ALLERGIES: Amoxicillin and Doxycycline    Review of Systems   All other systems reviewed and are negative. Vitals:    20 1436   BP: (!) 174/92   Pulse: (!) 103   Resp: 16   Temp: 98.2 °F (36.8 °C)   SpO2: 95%   Weight: 95 kg (209 lb 7 oz)   Height: 5' 10.5\" (1.791 m)            Physical Exam  Constitutional:       Appearance: Normal appearance. HENT:      Head:      Jaw: There is normal jaw occlusion. Nose: Nose normal.      Mouth/Throat:      Mouth: Mucous membranes are moist.      Comments: No dental trauma. No malocclusion. Eyes:      Extraocular Movements: Extraocular movements intact. Conjunctiva/sclera: Conjunctivae normal.      Pupils: Pupils are equal, round, and reactive to light. Neck:      Musculoskeletal: Normal range of motion and neck supple. Comments: Neck nontender  Cardiovascular:      Rate and Rhythm: Tachycardia present. Rhythm irregular. Pulmonary:      Effort: Pulmonary effort is normal.   Abdominal:      General: There is no distension. Palpations: Abdomen is soft. Tenderness: There is no abdominal tenderness. Musculoskeletal:        Hands:       Right lower leg: No edema. Left lower leg: No edema. Skin:     General: Skin is warm and dry. Neurological:      General: No focal deficit present. Mental Status: She is alert. Cranial Nerves: No cranial nerve deficit. Sensory: No sensory deficit. Motor: No weakness. Coordination: Coordination normal.   Psychiatric:         Mood and Affect: Mood normal.          MDM       Wound Closure by Adhesive  Date/Time: 4/8/2020 3:12 PM  Performed by: Leena Cuenca DO  Authorized by: Leena Cuenca DO     Consent:     Consent obtained:  Verbal    Consent given by:  Patient  Laceration details:     Length (cm):  1.5  Repair type:     Repair type:  Simple  Exploration:     Contaminated: no    Treatment:     Area cleansed with:  Soap and water and Shur-Clens    Amount of cleaning:  Standard    Irrigation solution:  Tap water    Visualized foreign bodies/material removed: no    Skin repair:     Repair method:  Tissue adhesive  Approximation:     Approximation:  Close  Post-procedure details:     Dressing:  Open (no dressing)    Patient tolerance of procedure: Tolerated well, no immediate complications  Comments:      2 lacs on forehead and 1 lac below L nare closed - 1.5 cm total length. Reviewed CT images    Reviewed x-ray images    Removed rings from L 4th digit easily. DC home    I explained that she may have a nasal bone fracture but there is no deviation of her nose right now. She will little with swelling go down over the next few weeks and if it is deviated, she can see her primary care doctor. No facial CT was needed. Did not want any meds for pain.

## 2020-04-08 NOTE — ED TRIAGE NOTES
Pt arrives with fall at the grocery store parking lot, resulting in laceration to forehead x 2 and some abrasion to upper lip/nose. Takes Eliquis, last dose this morning. Denies LOC.

## 2020-04-08 NOTE — DISCHARGE INSTRUCTIONS
Patient Education     Head Injury: After Your Visit to the Emergency Room  Your Care Instructions  You were seen in the emergency room for a head injury. Have another adult watch you closely for the next 24 hours. Ask the person to watch for signs that your head injury is getting worse, and make sure that he or she knows what to do if these signs appear. Even though you have been released from the emergency room, you still need to watch for any problems. The doctor carefully checked you. But sometimes problems can develop later. If you have new symptoms, or if your symptoms do not get better, return to the emergency room or call your doctor right away. A concussion means you hit your head hard enough to injure your brain. If you had a concussion, you may have symptoms that last for a few days to months. You may have changes in how well you think, concentrate, or remember; changes in your sleep patterns; or other symptoms. Although this is common after a concussion, any symptoms that are new or that are getting worse could be signs of a more serious problem. A visit to the emergency room is only one step in your treatment. Even if you feel better, you still need to do what your doctor recommends, such as going to all suggested follow-up appointments and taking medicines exactly as directed. This will help you recover and help prevent future problems. How can you care for yourself at home? · Take it easy for the next few days, or longer if you are not feeling well. Do not try to do too much. · Get plenty of sleep at night. Try to rest during the day. · Ask your doctor if you can take an over-the-counter pain medicine, such as acetaminophen (Tylenol), ibuprofen (Advil, Motrin), or naproxen (Aleve). Read and follow all instructions on the label. Do not take two or more pain medicines at the same time unless the doctor told you to. Many pain medicines have acetaminophen, which is Tylenol.  Too much acetaminophen (Tylenol) can be harmful. · Put ice or a cold pack on the area for 10 to 20 minutes at a time. Put a thin cloth between the ice and your skin. · Do not drink any alcohol for 24 hours or until your doctor tells you it is okay. · Avoid activities that could lead to another head injury. Avoid contact sports until your doctor says that you can do them. An athlete should never go back into a game after a head injury. · Until your doctor says it is okay, do not drive or do other things that require you to be alert. When should you call for help? Call 911 if:  · You have twitching, jerking, or a seizure. · You passed out (lost consciousness). · You have other symptoms that you think are a medical emergency. Return to the emergency room now if:  · You continue to vomit for more than 2 hours, or you have new vomiting. · You have clear or bloody fluid coming from your nose or ears. · The person checking on you has a hard time waking you. · You are confused, cannot think clearly, or do not know where you are. · You have trouble walking or talking. · You have new weakness or numbness in any part of your body. · You have bruises around both eyes (\"raccoon eyes\"). Call your doctor today if:  · Your headaches get worse. Where can you learn more? Go to [employer]  Enter C324 in the search box to learn more about \"Head Injury: After Your Visit to the Emergency Room. \"   © 8243-3768 Healthwise, Incorporated. Care instructions adapted under license by [employer] (which disclaims liability or warranty for this information). This care instruction is for use with your licensed healthcare professional. If you have questions about a medical condition or this instruction, always ask your healthcare professional. [place] [address] any warranty or liability for your use of this information. Content Version: 9.4.93181;  Last Revised: July 27, 2010             Patient Education Cuts Closed With Adhesives: Care Instructions  Your Care Instructions  A cut can happen anywhere on your body. The doctor used an adhesive to close the cut. When the adhesive dries, it forms a film that holds the edges of the cut together. Skin adhesives are sometimes called liquid stitches. If the cut went deep and through the skin, the doctor may have put in a layer of stitches below the adhesive. The deeper layer of stitches brings the deep part of the cut together. These stitches will dissolve and don't need to be removed. You don't see the stitches, only the adhesive. You may have a bandage. The doctor has checked you carefully, but problems can develop later. If you notice any problems or new symptoms, get medical treatment right away. Follow-up care is a key part of your treatment and safety. Be sure to make and go to all appointments, and call your doctor if you are having problems. It's also a good idea to know your test results and keep a list of the medicines you take. How can you care for yourself at home? · Keep the cut dry for the first 24 to 48 hours. After this, you can shower if your doctor okays it. Pat the cut dry. · Don't soak the cut, such as in a bathtub. Your doctor will tell you when it's safe to get the cut wet. · If your doctor told you how to care for your cut, follow your doctor's instructions. If you did not get instructions, follow this general advice:  ? Do not put any kind of ointment, cream, or lotion over the area. This can make the adhesive fall off too soon. ? After the first 24 to 48 hours, wash around the cut with clean water 2 times a day. Do not use hydrogen peroxide or alcohol, which can slow healing. ? If the doctor told you to use a bandage, put on a new bandage after cleaning the cut or if the bandage gets wet or dirty. · Prop up the sore area on a pillow anytime you sit or lie down during the next 3 days. Try to keep it above the level of your heart.  This will help reduce swelling. · Leave the skin adhesive on your skin until it falls off on its own. This may take 5 to 10 days. · Do not scratch, rub, or pick at the adhesive. · Do not put the sticky part of a bandage directly on the adhesive. · Avoid any activity that could cause your cut to reopen. · Be safe with medicines. Read and follow all instructions on the label. ? If the doctor gave you a prescription medicine for pain, take it as prescribed. ? If you are not taking a prescription pain medicine, ask your doctor if you can take an over-the-counter medicine. When should you call for help? Call your doctor now or seek immediate medical care if:    · You have new pain, or your pain gets worse.     · The skin near the cut is cold or pale or changes color.     · You have tingling, weakness, or numbness near the cut.     · The cut starts to bleed.     · You have trouble moving the area near the cut.     · You have symptoms of infection, such as:  ? Increased pain, swelling, warmth, or redness around the cut.  ? Red streaks leading from the cut.  ? Pus draining from the cut.  ? A fever.    Watch closely for changes in your health, and be sure to contact your doctor if:    · The cut reopens.     · You do not get better as expected. Where can you learn more? Go to http://jesu-viridiana.info/  Enter P174 in the search box to learn more about \"Cuts Closed With Adhesives: Care Instructions. \"  Current as of: June 26, 2019Content Version: 12.4  © 9840-1855 Healthwise, Incorporated. Care instructions adapted under license by [employer] (which disclaims liability or warranty for this information). If you have questions about a medical condition or this instruction, always ask your healthcare professional. [address] any warranty or liability for your use of this information.

## 2020-04-09 ENCOUNTER — PATIENT OUTREACH (OUTPATIENT)
Dept: INTERNAL MEDICINE CLINIC | Age: 70
End: 2020-04-09

## 2020-04-09 ENCOUNTER — TELEPHONE (OUTPATIENT)
Dept: INTERNAL MEDICINE CLINIC | Age: 70
End: 2020-04-09

## 2020-04-09 NOTE — TELEPHONE ENCOUNTER
Pt called said that she had a fall yesterday at [facility] and she was taken to short [facility] ER to have x-rays done said that she is fine

## 2020-04-09 NOTE — PROGRESS NOTES
Patient contacted regarding recent discharge and COVID-19 risk   Ambulatory Care Manager contacted the patient by telephone to perform post discharge assessment. Verified name and  with patient as identifiers. Patient has following risk factors of: ED visit for fall with facial lacerations . ACM reviewed discharge instructions, medical action plan and red flags related to discharge diagnosis. No new or changed medications related to discharge diagnosis. .    Education provided regarding infection prevention, and signs and symptoms of COVID-19 and when to seek medical attention with patient who verbalized understanding. Discussed exposure protocols and quarantine from [address] you at higher risk for severe illness  and given an opportunity for questions and concerns. The patient agrees to contact the COVID-19 hotline [phone] or PCP office for questions related to their healthcare. CTN/ACM provided contact information for future reference. From CDC: Are you at higher risk for severe illness?  Wash your hands often.  Avoid close contact (6 feet, which is about two arm lengths) with people who are sick.  Put distance between yourself and other people if COVID-19 is spreading in your community.  Clean and disinfect frequently touched surfaces.  Avoid all cruise travel and non-essential air travel.  Call your healthcare professional if you have concerns about COVID-19 and your underlying condition or if you are sick. For more information on steps you can take to protect yourself, see CDC's How to Protect Yourself      Patient/family/caregiver given information for [facility] [place] and agrees to enroll no  Patient's preferred e-mail:  n/a  Patient's preferred phone number: n/a  Based on Loop alert triggers, patient will be contacted by nurse care manager for worsening symptoms.     Plan for follow-up call in 14 days based on severity of symptoms and risk factors    Feli Army Henry Kelley RN  Ambulatory Care Manager

## 2020-04-23 ENCOUNTER — PATIENT OUTREACH (OUTPATIENT)
Dept: INTERNAL MEDICINE CLINIC | Age: 70
End: 2020-04-23

## 2020-04-23 NOTE — PROGRESS NOTES
Patient resolved from Transition of Care episode on 4/23/2020. Patient/family has been provided the following resources and education related to COVID-19:                         Signs, symptoms and red flags related to COVID-19            CDC exposure and quarantine guidelines            Conduit exposure contact - [phone]            Contact for their local Department of Health                 Patient currently reports that the following symptoms have improved:  no COVID symptoms. No further outreach scheduled with this CTN/ACM. Episode of Care resolved. Patient has this CTN/ACM contact information if future needs arise.

## 2020-06-08 ENCOUNTER — TELEPHONE (OUTPATIENT)
Dept: INTERNAL MEDICINE CLINIC | Age: 70
End: 2020-06-08

## 2020-06-08 NOTE — TELEPHONE ENCOUNTER
Please be advised that I had my second Shingles shot on June 7, 2020. Dixie How[place] had a  Glaucoma screening in October 2019.

## 2020-08-17 ENCOUNTER — OFFICE VISIT (OUTPATIENT)
Dept: INTERNAL MEDICINE CLINIC | Age: 70
End: 2020-08-17
Payer: MEDICARE

## 2020-08-17 VITALS
SYSTOLIC BLOOD PRESSURE: 160 MMHG | HEART RATE: 91 BPM | RESPIRATION RATE: 16 BRPM | BODY MASS INDEX: 28.19 KG/M2 | OXYGEN SATURATION: 97 % | TEMPERATURE: 97.5 F | DIASTOLIC BLOOD PRESSURE: 85 MMHG | HEIGHT: 71 IN | WEIGHT: 201.4 LBS

## 2020-08-17 DIAGNOSIS — Z01.818 PREOP EXAM FOR INTERNAL MEDICINE: Primary | ICD-10-CM

## 2020-08-17 DIAGNOSIS — I48.19 PERSISTENT ATRIAL FIBRILLATION (HCC): ICD-10-CM

## 2020-08-17 DIAGNOSIS — I10 ESSENTIAL HYPERTENSION: ICD-10-CM

## 2020-08-17 PROCEDURE — G8399 PT W/DXA RESULTS DOCUMENT: HCPCS | Performed by: INTERNAL MEDICINE

## 2020-08-17 PROCEDURE — 1090F PRES/ABSN URINE INCON ASSESS: CPT | Performed by: INTERNAL MEDICINE

## 2020-08-17 PROCEDURE — 1100F PTFALLS ASSESS-DOCD GE2>/YR: CPT | Performed by: INTERNAL MEDICINE

## 2020-08-17 PROCEDURE — 99214 OFFICE O/P EST MOD 30 MIN: CPT | Performed by: INTERNAL MEDICINE

## 2020-08-17 PROCEDURE — G0463 HOSPITAL OUTPT CLINIC VISIT: HCPCS | Performed by: INTERNAL MEDICINE

## 2020-08-17 PROCEDURE — G8419 CALC BMI OUT NRM PARAM NOF/U: HCPCS | Performed by: INTERNAL MEDICINE

## 2020-08-17 PROCEDURE — 93010 ELECTROCARDIOGRAM REPORT: CPT | Performed by: INTERNAL MEDICINE

## 2020-08-17 PROCEDURE — 93005 ELECTROCARDIOGRAM TRACING: CPT | Performed by: INTERNAL MEDICINE

## 2020-08-17 PROCEDURE — G8432 DEP SCR NOT DOC, RNG: HCPCS | Performed by: INTERNAL MEDICINE

## 2020-08-17 PROCEDURE — G9899 SCRN MAM PERF RSLTS DOC: HCPCS | Performed by: INTERNAL MEDICINE

## 2020-08-17 PROCEDURE — 3017F COLORECTAL CA SCREEN DOC REV: CPT | Performed by: INTERNAL MEDICINE

## 2020-08-17 PROCEDURE — G8427 DOCREV CUR MEDS BY ELIG CLIN: HCPCS | Performed by: INTERNAL MEDICINE

## 2020-08-17 PROCEDURE — G8753 SYS BP > OR = 140: HCPCS | Performed by: INTERNAL MEDICINE

## 2020-08-17 PROCEDURE — G8536 NO DOC ELDER MAL SCRN: HCPCS | Performed by: INTERNAL MEDICINE

## 2020-08-17 PROCEDURE — 3288F FALL RISK ASSESSMENT DOCD: CPT | Performed by: INTERNAL MEDICINE

## 2020-08-17 PROCEDURE — G8754 DIAS BP LESS 90: HCPCS | Performed by: INTERNAL MEDICINE

## 2020-08-17 NOTE — PROGRESS NOTES
Chief Complaint   Patient presents with    Pre-op Exam     1. Have you been to the ER, urgent care clinic since your last visit? Hospitalized since your last visit? Yes Where: [facility] Reason for visit: 04/2020-fall    2. Have you seen or consulted any other health care providers outside of the [facility] since your last visit? Include any pap smears or colon screening.  No

## 2020-08-17 NOTE — PROGRESS NOTES
Preoperative Asessment    Dempsey Sever is 68 y.o. female (1950) who presents for preoperative evaluation. Procedure/Surgery: Cataract surgery   Date of Procedure/Surgery: 8/27/20  Surgeon: Dr. Yasmeen Worthington: [address] [place] [address]  Primary Physician: Donna Gee MD      Patient Active Problem List   Diagnosis Code    Persistent atrial fibrillation [facility]) I48.19    Essential hypertension I10       Prior to Admission medications    Medication Sig Start Date End Date Taking? Authorizing Provider   metoprolol succinate (TOPROL-XL) 50 mg XL tablet Take 1 Tab by mouth daily. 6/24/16  Yes Fab Quinones MD   losartan (COZAAR) 100 mg tablet Take 100 mg by mouth daily. Yes Provider, Historical   apixaban (ELIQUIS) 5 mg tablet Take 5 mg by mouth two (2) times a day. Yes Provider, Historical       Review of Systems   Constitutional: Negative. Respiratory: Negative. Genitourinary: Negative. Latex Allergy: None  Recent use of: No recent use of aspirin (ASA), NSAIDS or steroids. On Eliquis  Tetanus up to date: last tetanus booster within 10 years  Anesthesia Complications: None  History of abnormal bleeding : None  History of Blood Transfusions: no  Health Care Directive or Living Will: yes    Visit Vitals  /85   Pulse 91   Temp 97.5 °F (36.4 °C) (Temporal)   Resp 16   Ht 5' 10.5\" (1.791 m)   Wt 201 lb 6.4 oz (91.4 kg)   SpO2 97%   BMI 28.49 kg/m²         Physical Exam  Constitutional:       General: She is not in acute distress. Cardiovascular:      Rate and Rhythm: Normal rate and regular rhythm. Heart sounds: No murmur. Pulmonary:      Effort: Pulmonary effort is normal.      Breath sounds: Normal breath sounds.              EKG: EKG FINDINGS - normal EKG, normal sinus rhythm, unchanged from previous tracings    Labs:   Lab Results   Component Value Date/Time    WBC 6.6 02/25/2020 07:48 AM    HGB 15.1 02/25/2020 07:48 AM    HCT 44.3 02/25/2020 07:48 AM    PLATELET 091 30/20/3065 07:48 AM    MCV 90 02/25/2020 07:48 AM     Lab Results   Component Value Date/Time    Cholesterol, total 177 02/25/2020 07:48 AM    HDL Cholesterol 59 02/25/2020 07:48 AM    LDL, calculated 101 (H) 02/25/2020 07:48 AM    Triglyceride 83 02/25/2020 07:48 AM     Lab Results   Component Value Date/Time    ALT (SGPT) 13 02/25/2020 07:48 AM    Alk. phosphatase 75 02/25/2020 07:48 AM    Bilirubin, total 0.8 02/25/2020 07:48 AM    Albumin 4.3 02/25/2020 07:48 AM    Protein, total 6.6 02/25/2020 07:48 AM    PLATELET 822 76/33/8695 07:48 AM     Lab Results   Component Value Date/Time    GFR est non-AA 63 02/25/2020 07:48 AM    GFR est AA 72 02/25/2020 07:48 AM    Creatinine 0.92 02/25/2020 07:48 AM    BUN 21 02/25/2020 07:48 AM    Sodium 140 02/25/2020 07:48 AM    Potassium 4.3 02/25/2020 07:48 AM    Chloride 102 02/25/2020 07:48 AM    CO2 23 02/25/2020 07:48 AM           Diagnoses and all orders for this visit:    1. Preop exam for internal medicine  -     AMB POC EKG ROUTINE W/ 12 LEADS, INTER & REP    2. Persistent atrial fibrillation ([address]    3. Essential hypertension        After reviewing the patient's history & exam she is low risk for cardiac complications.   No contraindications to planned surgery

## 2021-01-26 ENCOUNTER — TELEPHONE (OUTPATIENT)
Dept: INTERNAL MEDICINE CLINIC | Age: 71
End: 2021-01-26

## 2021-01-26 DIAGNOSIS — I10 ESSENTIAL HYPERTENSION: ICD-10-CM

## 2021-01-26 DIAGNOSIS — I48.19 PERSISTENT ATRIAL FIBRILLATION (HCC): Primary | ICD-10-CM

## 2021-01-26 NOTE — TELEPHONE ENCOUNTER
Patient has an appt on 3/8 with Dr. Marck Miles. Patient would like to get her labs drawn ahead of time. Please order labs.

## 2021-03-02 ENCOUNTER — LAB ONLY (OUTPATIENT)
Dept: INTERNAL MEDICINE CLINIC | Age: 71
End: 2021-03-02

## 2021-03-02 DIAGNOSIS — I10 ESSENTIAL HYPERTENSION: ICD-10-CM

## 2021-03-02 LAB
ALBUMIN SERPL-MCNC: 4 G/DL (ref 3.5–5)
ALBUMIN/GLOB SERPL: 1.4 {RATIO} (ref 1.1–2.2)
ALP SERPL-CCNC: 75 U/L (ref 45–117)
ALT SERPL-CCNC: 24 U/L (ref 12–78)
ANION GAP SERPL CALC-SCNC: 8 MMOL/L (ref 5–15)
AST SERPL-CCNC: 21 U/L (ref 15–37)
BASOPHILS # BLD: 0 K/UL (ref 0–0.1)
BASOPHILS NFR BLD: 1 % (ref 0–1)
BILIRUB SERPL-MCNC: 1.3 MG/DL (ref 0.2–1)
BUN SERPL-MCNC: 27 MG/DL (ref 6–20)
BUN/CREAT SERPL: 28 (ref 12–20)
CALCIUM SERPL-MCNC: 9.4 MG/DL (ref 8.5–10.1)
CHLORIDE SERPL-SCNC: 106 MMOL/L (ref 97–108)
CHOLEST SERPL-MCNC: 195 MG/DL
CO2 SERPL-SCNC: 25 MMOL/L (ref 21–32)
CREAT SERPL-MCNC: 0.98 MG/DL (ref 0.55–1.02)
DIFFERENTIAL METHOD BLD: NORMAL
EOSINOPHIL # BLD: 0 K/UL (ref 0–0.4)
EOSINOPHIL NFR BLD: 1 % (ref 0–7)
ERYTHROCYTE [DISTWIDTH] IN BLOOD BY AUTOMATED COUNT: 13.1 % (ref 11.5–14.5)
GLOBULIN SER CALC-MCNC: 2.9 G/DL (ref 2–4)
GLUCOSE SERPL-MCNC: 98 MG/DL (ref 65–100)
HCT VFR BLD AUTO: 44.3 % (ref 35–47)
HDLC SERPL-MCNC: 71 MG/DL
HDLC SERPL: 2.7 {RATIO} (ref 0–5)
HGB BLD-MCNC: 15 G/DL (ref 11.5–16)
IMM GRANULOCYTES # BLD AUTO: 0 K/UL (ref 0–0.04)
IMM GRANULOCYTES NFR BLD AUTO: 0 % (ref 0–0.5)
LDLC SERPL CALC-MCNC: 108.6 MG/DL (ref 0–100)
LIPID PROFILE,FLP: ABNORMAL
LYMPHOCYTES # BLD: 1.1 K/UL (ref 0.8–3.5)
LYMPHOCYTES NFR BLD: 18 % (ref 12–49)
MCH RBC QN AUTO: 31.3 PG (ref 26–34)
MCHC RBC AUTO-ENTMCNC: 33.9 G/DL (ref 30–36.5)
MCV RBC AUTO: 92.3 FL (ref 80–99)
MONOCYTES # BLD: 0.5 K/UL (ref 0–1)
MONOCYTES NFR BLD: 8 % (ref 5–13)
NEUTS SEG # BLD: 4.6 K/UL (ref 1.8–8)
NEUTS SEG NFR BLD: 72 % (ref 32–75)
NRBC # BLD: 0 K/UL (ref 0–0.01)
NRBC BLD-RTO: 0 PER 100 WBC
PLATELET # BLD AUTO: 167 K/UL (ref 150–400)
PMV BLD AUTO: 10.5 FL (ref 8.9–12.9)
POTASSIUM SERPL-SCNC: 4.7 MMOL/L (ref 3.5–5.1)
PROT SERPL-MCNC: 6.9 G/DL (ref 6.4–8.2)
RBC # BLD AUTO: 4.8 M/UL (ref 3.8–5.2)
SODIUM SERPL-SCNC: 139 MMOL/L (ref 136–145)
TRIGL SERPL-MCNC: 77 MG/DL (ref ?–150)
VLDLC SERPL CALC-MCNC: 15.4 MG/DL
WBC # BLD AUTO: 6.3 K/UL (ref 3.6–11)

## 2021-03-08 ENCOUNTER — OFFICE VISIT (OUTPATIENT)
Dept: INTERNAL MEDICINE CLINIC | Age: 71
End: 2021-03-08
Payer: MEDICARE

## 2021-03-08 VITALS
BODY MASS INDEX: 27.22 KG/M2 | OXYGEN SATURATION: 98 % | TEMPERATURE: 97.5 F | HEART RATE: 80 BPM | RESPIRATION RATE: 16 BRPM | DIASTOLIC BLOOD PRESSURE: 90 MMHG | SYSTOLIC BLOOD PRESSURE: 170 MMHG | WEIGHT: 194.4 LBS | HEIGHT: 71 IN

## 2021-03-08 DIAGNOSIS — Z13.39 SCREENING FOR ALCOHOLISM: ICD-10-CM

## 2021-03-08 DIAGNOSIS — Z00.00 MEDICARE ANNUAL WELLNESS VISIT, SUBSEQUENT: Primary | ICD-10-CM

## 2021-03-08 PROCEDURE — G8419 CALC BMI OUT NRM PARAM NOF/U: HCPCS | Performed by: INTERNAL MEDICINE

## 2021-03-08 PROCEDURE — 3017F COLORECTAL CA SCREEN DOC REV: CPT | Performed by: INTERNAL MEDICINE

## 2021-03-08 PROCEDURE — G8753 SYS BP > OR = 140: HCPCS | Performed by: INTERNAL MEDICINE

## 2021-03-08 PROCEDURE — G8536 NO DOC ELDER MAL SCRN: HCPCS | Performed by: INTERNAL MEDICINE

## 2021-03-08 PROCEDURE — G0442 ANNUAL ALCOHOL SCREEN 15 MIN: HCPCS | Performed by: INTERNAL MEDICINE

## 2021-03-08 PROCEDURE — 1100F PTFALLS ASSESS-DOCD GE2>/YR: CPT | Performed by: INTERNAL MEDICINE

## 2021-03-08 PROCEDURE — G8510 SCR DEP NEG, NO PLAN REQD: HCPCS | Performed by: INTERNAL MEDICINE

## 2021-03-08 PROCEDURE — G8427 DOCREV CUR MEDS BY ELIG CLIN: HCPCS | Performed by: INTERNAL MEDICINE

## 2021-03-08 PROCEDURE — G0439 PPPS, SUBSEQ VISIT: HCPCS | Performed by: INTERNAL MEDICINE

## 2021-03-08 PROCEDURE — G8755 DIAS BP > OR = 90: HCPCS | Performed by: INTERNAL MEDICINE

## 2021-03-08 PROCEDURE — G9899 SCRN MAM PERF RSLTS DOC: HCPCS | Performed by: INTERNAL MEDICINE

## 2021-03-08 PROCEDURE — 3288F FALL RISK ASSESSMENT DOCD: CPT | Performed by: INTERNAL MEDICINE

## 2021-03-08 PROCEDURE — G8399 PT W/DXA RESULTS DOCUMENT: HCPCS | Performed by: INTERNAL MEDICINE

## 2021-03-08 NOTE — PATIENT INSTRUCTIONS

## 2021-03-08 NOTE — PROGRESS NOTES
This is the Subsequent Medicare Annual Wellness Exam, performed 12 months or more after the Initial AWV or the last Subsequent AWV    I have reviewed the patient's medical history in detail and updated the computerized patient record. She will see cardiology in May. Dr. Irving Pabon. She has white coat hypertension. Stable at this time. Her pressures at home are normal.  She denies chest pain or shortness of breath. Reviewed labs together, stable. Depression Risk Factor Screening:     3 most recent PHQ Screens 3/8/2021   Little interest or pleasure in doing things Not at all   Feeling down, depressed, irritable, or hopeless Not at all   Total Score PHQ 2 0       Alcohol Risk Screen    Do you average more than 1 drink per night or more than 7 drinks a week:  No    On any one occasion in the past three months have you have had more than 3 drinks containing alcohol:  No        Functional Ability and Level of Safety:    Hearing: Hearing is good. Activities of Daily Living: The home contains: no safety equipment. Patient does total self care      Ambulation: with no difficulty     Fall Risk:  Fall Risk Assessment, last 12 mths 3/8/2021   Able to walk? Yes   Fall in past 12 months? 1   Do you feel unsteady? 0   Are you worried about falling 0   Is TUG test greater than 12 seconds? 0   Is the gait abnormal? 0   Number of falls in past 12 months 1   Fall with injury? 1      Abuse Screen:  Patient is not abused       Cognitive Screening    Has your family/caregiver stated any concerns about your memory: no         Assessment/Plan   Education and counseling provided:  Are appropriate based on today's review and evaluation    Diagnoses and all orders for this visit:    1. Medicare annual wellness visit, subsequent    2.  Screening for alcoholism  -     [place] ANNUAL ALCOHOL SCREEN 15 MIN        Health Maintenance Due     Health Maintenance Due   Topic Date Due    COVID-19 Vaccine (1 of 2) Never done   [address] [place] [address] Colorectal Cancer Screening Combo  2019    GLAUCOMA SCREENING Q2Y  10/10/2019       Patient Care Team   Patient Care Team:  Mary Maddox MD as PCP - General (Internal Medicine)  Mary Maddox MD as PCP - [address] [place] [address]  [facility] Provider  Leslie Pollock MD (Cardiology)  Gagandeep Leblanc MD (Ophthalmology)  Marlo Vuong MD (Gastroenterology)    History     Patient Active Problem List   Diagnosis Code    Persistent atrial fibrillation [facility]) I48.19    Essential hypertension I10     Past Medical History:   Diagnosis Date    Arrhythmia     Hypertension       Past Surgical History:   Procedure Laterality Date    HX APPENDECTOMY      HX CHOLECYSTECTOMY      HX OTHER SURGICAL      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  HX TONSILLECTOMY       Current Outpatient Medications   Medication Sig Dispense Refill    metoprolol succinate (TOPROL-XL) 50 mg XL tablet Take 1 Tab by mouth daily. 30 Tab 11    losartan (COZAAR) 100 mg tablet Take 100 mg by mouth daily.  apixaban (ELIQUIS) 5 mg tablet Take 5 mg by mouth two (2) times a day. Allergies   Allergen Reactions    Amoxicillin Hives    Doxycycline Rash       Family History   Problem Relation Age of Onset    Hypertension Mother     Alzheimer Mother     Prostate Cancer Father     Diabetes Father     Hypertension Father     Diabetes Sister         typpe 1    Prostate Cancer Brother     Hypertension Brother     Alzheimer Brother      Social History     Tobacco Use    Smoking status: Former Smoker     Quit date:      Years since quittin.2    Smokeless tobacco: Never Used   Substance Use Topics    Alcohol use:  No

## 2021-03-08 NOTE — PROGRESS NOTES
Chief Complaint   [facility]   [facility]     Reviewed record in preparation for visit and have obtained necessary documentation. Identified pt with two pt identifiers(name and ). Health Maintenance Due   Topic    COVID-19 Vaccine (1 of 2)    Colorectal Cancer Screening Combo     GLAUCOMA SCREENING Q2Y     Medicare Yearly Exam          Chief Complaint   Patient presents with    Annual Wellness Visit        Wt Readings from Last 3 Encounters:   21 194 lb 6.4 oz (88.2 kg)   20 201 lb 6.4 oz (91.4 kg)   20 209 lb 7 oz (95 kg)     Temp Readings from Last 3 Encounters:   21 97.5 °F (36.4 °C) (Temporal)   20 97.5 °F (36.4 °C) (Temporal)   20 98.2 °F (36.8 °C)     BP Readings from Last 3 Encounters:   21 (!) 170/90   20 160/85   20 152/61     Pulse Readings from Last 3 Encounters:   21 80   20 91   20 (!) 101           Learning Assessment:  :     Learning Assessment 2018   PRIMARY LEARNER Patient   HIGHEST LEVEL OF EDUCATION - PRIMARY LEARNER  4 YEARS OF COLLEGE   BARRIERS PRIMARY LEARNER NONE   PRIMARY LANGUAGE ENGLISH   LEARNER PREFERENCE PRIMARY LISTENING   ANSWERED BY pt   RELATIONSHIP SELF       Depression Screening:  :     3 most recent PHQ Screens 3/8/2021   Little interest or pleasure in doing things Not at all   Feeling down, depressed, irritable, or hopeless Not at all   Total Score PHQ 2 0       Fall Risk Assessment:  :     Fall Risk Assessment, last 12 mths 3/8/2021   Able to walk? Yes   Fall in past 12 months? 1   Do you feel unsteady? 0   Are you worried about falling 0   Is TUG test greater than 12 seconds? 0   Is the gait abnormal? 0   Number of falls in past 12 months 1   Fall with injury? 1       Abuse Screening:  :     Abuse Screening Questionnaire 3/4/2019 2018   Do you ever feel afraid of your partner?  N N   Are you in a relationship with someone who physically or mentally threatens you? N N   Is it safe for you to go home? Y Y       Coordination of Care Questionnaire:  :     1) Have you been to an emergency room, urgent care clinic since your last visit? no   Hospitalized since your last visit? no             2) Have you seen or consulted any other health care providers outside of [address] [place] [address] since your last visit? no  (Include any pap smears or colon screenings in this section.)    3) Do you have an Advance Directive on file? no    4) Are you interested in receiving information on Advance Directives? NO      Patient is accompanied by self I have received verbal consent from Scot Arthur to discuss any/all medical information while they are present in the room. Reviewed record  In preparation for visit and have obtained necessary documentation.

## 2021-07-19 ENCOUNTER — PATIENT OUTREACH (OUTPATIENT)
Dept: CASE MANAGEMENT | Age: 71
End: 2021-07-19

## 2021-07-19 NOTE — PROGRESS NOTES
Patient's son, Lee Hand, (on HIPPA for p/u medications) called and left message requesting medication for patient to help with sleep. Patient is the primary caregiver for her  who is suffering with alzheimer's/dementia. Will need to discuss with patient. Discussed with Dr. Reta Rodgers. Provided ecommendation to try melatonin at night, avoid caffeine and try herbal tea that promotes sleep ie lavendar/chamomile. Mr. Benito Drivers verbalized understanding.  KALIN

## 2022-03-18 PROBLEM — I10 ESSENTIAL HYPERTENSION: Status: ACTIVE | Noted: 2018-01-08

## 2022-03-19 PROBLEM — I48.19 PERSISTENT ATRIAL FIBRILLATION (HCC): Status: ACTIVE | Noted: 2018-01-08

## 2022-06-08 ENCOUNTER — TELEPHONE (OUTPATIENT)
Dept: INTERNAL MEDICINE CLINIC | Age: 72
End: 2022-06-08

## 2022-06-08 NOTE — TELEPHONE ENCOUNTER
Patient would like to know if Dr. Linda Atkinson will accept her as a new patient. Patient states both her son and daughter see Dr. Linda Atkinson.

## 2022-06-08 NOTE — TELEPHONE ENCOUNTER
Tatum Dominguez said she is not taking new patients. Please refer to one of the physicians who are taking new patients. Thank you.

## 2022-06-10 NOTE — TELEPHONE ENCOUNTER
PSR called and let patient know who is taking new patients. Patient stated she would call back later as she wanted to discuss this with her son.

## 2022-06-13 ENCOUNTER — OFFICE VISIT (OUTPATIENT)
Dept: INTERNAL MEDICINE CLINIC | Age: 72
End: 2022-06-13
Payer: MEDICARE

## 2022-06-13 ENCOUNTER — TELEPHONE (OUTPATIENT)
Dept: INTERNAL MEDICINE CLINIC | Age: 72
End: 2022-06-13

## 2022-06-13 VITALS
DIASTOLIC BLOOD PRESSURE: 80 MMHG | OXYGEN SATURATION: 100 % | SYSTOLIC BLOOD PRESSURE: 160 MMHG | TEMPERATURE: 97.5 F | HEART RATE: 98 BPM | WEIGHT: 188.6 LBS | RESPIRATION RATE: 16 BRPM | HEIGHT: 71 IN | BODY MASS INDEX: 26.4 KG/M2

## 2022-06-13 DIAGNOSIS — Z13.39 SCREENING FOR ALCOHOLISM: ICD-10-CM

## 2022-06-13 DIAGNOSIS — Z00.00 MEDICARE ANNUAL WELLNESS VISIT, SUBSEQUENT: Primary | ICD-10-CM

## 2022-06-13 DIAGNOSIS — I10 ESSENTIAL HYPERTENSION: ICD-10-CM

## 2022-06-13 DIAGNOSIS — I48.19 PERSISTENT ATRIAL FIBRILLATION (HCC): ICD-10-CM

## 2022-06-13 PROBLEM — H25.9 AGE-RELATED CATARACT: Status: ACTIVE | Noted: 2022-06-13

## 2022-06-13 PROCEDURE — G0439 PPPS, SUBSEQ VISIT: HCPCS | Performed by: INTERNAL MEDICINE

## 2022-06-13 PROCEDURE — G0442 ANNUAL ALCOHOL SCREEN 15 MIN: HCPCS | Performed by: INTERNAL MEDICINE

## 2022-06-13 PROCEDURE — G8417 CALC BMI ABV UP PARAM F/U: HCPCS | Performed by: INTERNAL MEDICINE

## 2022-06-13 PROCEDURE — G0463 HOSPITAL OUTPT CLINIC VISIT: HCPCS | Performed by: INTERNAL MEDICINE

## 2022-06-13 PROCEDURE — G8427 DOCREV CUR MEDS BY ELIG CLIN: HCPCS | Performed by: INTERNAL MEDICINE

## 2022-06-13 PROCEDURE — G8753 SYS BP > OR = 140: HCPCS | Performed by: INTERNAL MEDICINE

## 2022-06-13 PROCEDURE — G8399 PT W/DXA RESULTS DOCUMENT: HCPCS | Performed by: INTERNAL MEDICINE

## 2022-06-13 PROCEDURE — G8536 NO DOC ELDER MAL SCRN: HCPCS | Performed by: INTERNAL MEDICINE

## 2022-06-13 PROCEDURE — 1101F PT FALLS ASSESS-DOCD LE1/YR: CPT | Performed by: INTERNAL MEDICINE

## 2022-06-13 PROCEDURE — 3017F COLORECTAL CA SCREEN DOC REV: CPT | Performed by: INTERNAL MEDICINE

## 2022-06-13 PROCEDURE — G8754 DIAS BP LESS 90: HCPCS | Performed by: INTERNAL MEDICINE

## 2022-06-13 PROCEDURE — 1090F PRES/ABSN URINE INCON ASSESS: CPT | Performed by: INTERNAL MEDICINE

## 2022-06-13 PROCEDURE — G8510 SCR DEP NEG, NO PLAN REQD: HCPCS | Performed by: INTERNAL MEDICINE

## 2022-06-13 PROCEDURE — G9899 SCRN MAM PERF RSLTS DOC: HCPCS | Performed by: INTERNAL MEDICINE

## 2022-06-13 NOTE — PROGRESS NOTES
This is the Subsequent Medicare Annual Wellness Exam, performed 12 months or more after the Initial AWV or the last Subsequent AWV    I have reviewed the patient's medical history in detail and updated the computerized patient record. She has been dealing with her husbands recent admission to a memory care unit. He has severe dementia and had been developing combative behavior. We discussed that she has had difficulty with this transition but that she is working with a therapist and improving. She has no physical complaints other than some arthritic pains. Follows up with Dr. Braxton Naranjo. No acute changes. She will begin remote monitoring for blood pressure. Assessment/Plan   Education and counseling provided:  Are appropriate based on today's review and evaluation    1. Medicare annual wellness visit, subsequent  2. Screening for alcoholism  -     [place] ANNUAL ALCOHOL SCREEN 15 MIN  3. Essential hypertension  -     LIPID PANEL; Future  -     METABOLIC PANEL, COMPREHENSIVE; Future  -     CBC WITH AUTOMATED DIFF; Future  4. Persistent atrial fibrillation (HCC)       Depression Risk Factor Screening     3 most recent PHQ Screens 6/13/2022   Little interest or pleasure in doing things Not at all   Feeling down, depressed, irritable, or hopeless Not at all   Total Score PHQ 2 0       Alcohol & Drug Abuse Risk Screen    Do you average more than 1 drink per night or more than 7 drinks a week:  No    On any one occasion in the past three months have you have had more than 3 drinks containing alcohol:  No          Functional Ability and Level of Safety    Hearing: Hearing is good. Activities of Daily Living: The home contains: no safety equipment. Patient does total self care      Ambulation: with no difficulty     Fall Risk:  Fall Risk Assessment, last 12 mths 6/13/2022   Able to walk? Yes   Fall in past 12 months? 0   Do you feel unsteady?  0   Are you worried about falling 0   Is TUG test greater than 12 seconds? -   Is the gait abnormal? -   Number of falls in past 12 months -   Fall with injury? -      Abuse Screen:  Patient is not abused       Cognitive Screening    Has your family/caregiver stated any concerns about your memory: no         Health Maintenance Due     Health Maintenance Due   Topic Date Due    Colorectal Cancer Screening Combo  02/21/2019    Depression Screen  03/08/2022    Medicare Yearly Exam  03/09/2022       Patient Care Team   Patient Care Team:  Juan Goddard MD as PCP - General (Internal Medicine Physician)  Juan Goddard MD as PCP - [address] [place] [address] Provider  Katie Pina MD (Cardiovascular Disease Physician)  Ann Marie Johnson MD (Ophthalmology)  Fay Gleason MD (Gastroenterology)    History     Patient Active Problem List   Diagnosis Code    Persistent atrial fibrillation [facility]) I48.19    Essential hypertension I10     Past Medical History:   Diagnosis Date    Arrhythmia     Hypertension       Past Surgical History:   Procedure Laterality Date    HX APPENDECTOMY      HX CHOLECYSTECTOMY      HX OTHER SURGICAL      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  HX TONSILLECTOMY       Current Outpatient Medications   Medication Sig Dispense Refill    losartan (COZAAR) 100 mg tablet Take 100 mg by mouth daily.  apixaban (ELIQUIS) 5 mg tablet Take 5 mg by mouth two (2) times a day.  metoprolol succinate (TOPROL-XL) 50 mg XL tablet Take 1 Tab by mouth daily.  (Patient taking differently: Take 100 mg by mouth daily.) 30 Tab 11     Allergies   Allergen Reactions    Amoxicillin Hives    Doxycycline Rash       Family History   Problem Relation Age of Onset    Hypertension Mother     Alzheimer's Disease Mother     Prostate Cancer Father     Diabetes Father     Hypertension Father     Diabetes Sister         typpe 1    Prostate Cancer Brother     Hypertension Brother     Alzheimer's Disease Brother      Social History     Tobacco Use    Smoking status: Former Smoker     Quit date:      Years since quittin.4    Smokeless tobacco: Never Used   Substance Use Topics    Alcohol use: No         Efra Luciano MD

## 2022-06-13 NOTE — PATIENT INSTRUCTIONS

## 2022-06-13 NOTE — PROGRESS NOTES
Chief Complaint   Patient presents with   [facility] Wellness Visit     Reviewed record in preparation for visit and have obtained necessary documentation. Identified pt with two pt identifiers(name and ). Health Maintenance Due   Topic    Colorectal Cancer Screening Combo     Depression Screen     Medicare Yearly Exam          Chief Complaint   Patient presents with    Annual Wellness Visit        Wt Readings from Last 3 Encounters:   22 188 lb 9.6 oz (85.5 kg)   21 194 lb 6.4 oz (88.2 kg)   20 201 lb 6.4 oz (91.4 kg)     Temp Readings from Last 3 Encounters:   22 97.5 °F (36.4 °C)   21 97.5 °F (36.4 °C) (Temporal)   20 97.5 °F (36.4 °C) (Temporal)     BP Readings from Last 3 Encounters:   22 (!) 160/80   21 (!) 170/90   20 160/85     Pulse Readings from Last 3 Encounters:   22 98   21 80   20 91           Learning Assessment:  :     Learning Assessment 2018   PRIMARY LEARNER Patient   HIGHEST LEVEL OF EDUCATION - PRIMARY LEARNER  4 YEARS OF COLLEGE   BARRIERS PRIMARY LEARNER NONE   PRIMARY LANGUAGE ENGLISH   LEARNER PREFERENCE PRIMARY LISTENING   ANSWERED BY pt   RELATIONSHIP SELF       Depression Screening:  :     3 most recent PHQ Screens 2022   Little interest or pleasure in doing things Not at all   Feeling down, depressed, irritable, or hopeless Not at all   Total Score PHQ 2 0       Fall Risk Assessment:  :     Fall Risk Assessment, last 12 mths 2022   Able to walk? Yes   Fall in past 12 months? 0   Do you feel unsteady? 0   Are you worried about falling 0   Is TUG test greater than 12 seconds? -   Is the gait abnormal? -   Number of falls in past 12 months -   Fall with injury? -       Abuse Screening:  :     Abuse Screening Questionnaire 2022 3/4/2019 2018   Do you ever feel afraid of your partner? N N N   Are you in a relationship with someone who physically or mentally threatens you?  N N N   Is it safe for you to go home? Maribel Scott       Coordination of Care Questionnaire:  :     1) Have you been to an emergency room, urgent care clinic since your last visit? no   Hospitalized since your last visit? no             2) Have you seen or consulted any other health care providers outside of [address] [place] [address] since your last visit? yes  (Include any pap smears or colon screenings in this section.)    3) Do you have an Advance Directive on file? no    4) Are you interested in receiving information on Advance Directives? NO      Patient is accompanied by self I have received verbal consent from Mariajose Sinclair to discuss any/all medical information while they are present in the room. Reviewed record  In preparation for visit and have obtained necessary documentation.

## 2022-10-13 ENCOUNTER — OFFICE VISIT (OUTPATIENT)
Dept: INTERNAL MEDICINE CLINIC | Age: 72
End: 2022-10-13
Payer: MEDICARE

## 2022-10-13 VITALS
TEMPERATURE: 98.3 F | HEIGHT: 71 IN | BODY MASS INDEX: 26.23 KG/M2 | SYSTOLIC BLOOD PRESSURE: 124 MMHG | HEART RATE: 79 BPM | OXYGEN SATURATION: 98 % | RESPIRATION RATE: 18 BRPM | WEIGHT: 187.4 LBS | DIASTOLIC BLOOD PRESSURE: 76 MMHG

## 2022-10-13 DIAGNOSIS — I10 ESSENTIAL HYPERTENSION: ICD-10-CM

## 2022-10-13 DIAGNOSIS — F43.21 GRIEVING: ICD-10-CM

## 2022-10-13 DIAGNOSIS — E78.5 HYPERLIPIDEMIA, UNSPECIFIED HYPERLIPIDEMIA TYPE: ICD-10-CM

## 2022-10-13 DIAGNOSIS — Z76.89 ENCOUNTER TO ESTABLISH CARE: Primary | ICD-10-CM

## 2022-10-13 DIAGNOSIS — I48.19 PERSISTENT ATRIAL FIBRILLATION (HCC): ICD-10-CM

## 2022-10-13 PROCEDURE — 99204 OFFICE O/P NEW MOD 45 MIN: CPT | Performed by: INTERNAL MEDICINE

## 2022-10-13 PROCEDURE — G8399 PT W/DXA RESULTS DOCUMENT: HCPCS | Performed by: INTERNAL MEDICINE

## 2022-10-13 PROCEDURE — G8427 DOCREV CUR MEDS BY ELIG CLIN: HCPCS | Performed by: INTERNAL MEDICINE

## 2022-10-13 PROCEDURE — G8417 CALC BMI ABV UP PARAM F/U: HCPCS | Performed by: INTERNAL MEDICINE

## 2022-10-13 PROCEDURE — G9899 SCRN MAM PERF RSLTS DOC: HCPCS | Performed by: INTERNAL MEDICINE

## 2022-10-13 PROCEDURE — G0463 HOSPITAL OUTPT CLINIC VISIT: HCPCS | Performed by: INTERNAL MEDICINE

## 2022-10-13 PROCEDURE — G8536 NO DOC ELDER MAL SCRN: HCPCS | Performed by: INTERNAL MEDICINE

## 2022-10-13 PROCEDURE — 1090F PRES/ABSN URINE INCON ASSESS: CPT | Performed by: INTERNAL MEDICINE

## 2022-10-13 PROCEDURE — G8754 DIAS BP LESS 90: HCPCS | Performed by: INTERNAL MEDICINE

## 2022-10-13 PROCEDURE — 1101F PT FALLS ASSESS-DOCD LE1/YR: CPT | Performed by: INTERNAL MEDICINE

## 2022-10-13 PROCEDURE — 3017F COLORECTAL CA SCREEN DOC REV: CPT | Performed by: INTERNAL MEDICINE

## 2022-10-13 PROCEDURE — G8752 SYS BP LESS 140: HCPCS | Performed by: INTERNAL MEDICINE

## 2022-10-13 PROCEDURE — G8432 DEP SCR NOT DOC, RNG: HCPCS | Performed by: INTERNAL MEDICINE

## 2022-10-13 RX ORDER — METOPROLOL SUCCINATE 100 MG/1
TABLET, EXTENDED RELEASE ORAL
COMMUNITY
Start: 2022-10-07

## 2022-10-13 NOTE — PROGRESS NOTES
Yajaira Arnett is a 74 y.o. female who presents today for Establish Care (RM18// pt presenting today to establish care was previously seeing Dr Marleny Marcelo)  . She has a history of   Patient Active Problem List   Diagnosis Code    [facility] I48.19    Essential hypertension I10    Age-related cataract H25.9   . Today patient is here to establish care. Previous PCP Dr. Haris Tristan. she is switching because of personal preference. Records are not available for me to review. she does not have other concerns. Afib: on [address] and [address]. Not symptomatic, diagnosed by chance. Seeing Dr. Isha Sommers. PFO, closed in 2008. Patient did see a therapist for a while after deciding to place her  in memory care. Taking care of him during his worsening dementia had become overwhelming. Recently she has been having a bit of insomnia. Considering seeing a therapist again. Cataract removed in 2020. Hypertension- Home BP is stable. Hypertension ROS: taking medications as instructed, no medication side effects noted, no TIA's, no chest pain on exertion, no dyspnea on exertion, no swelling of ankles     reports that she quit smoking about 41 years ago. Her smoking use included cigarettes. She started smoking about 50 years ago. She has never used smokeless tobacco.    reports that she does not currently use alcohol. BP Readings from Last 2 Encounters:   10/13/22 (!) 133/94   06/13/22 (!) 160/80     Very mild HLD: has not been repeated. Social: Home alone,  in Memory Unit. Worked in secretarial work. Two children, 5 grandchildren (boys)    No smoking. Family History: reviewed, brother with dementia. Sister with T1D. Some family history of CRC in second degree relative. C-scope is UTD. Due next year with Dr. Joshua Dalton. ROS  Review of Systems   Constitutional:  Negative for chills, fever and weight loss. HENT:  Negative for congestion and sore throat. Eyes:  Negative for blurred vision, double vision and photophobia. Respiratory:  Negative for cough and shortness of breath. Cardiovascular:  Negative for chest pain, palpitations and leg swelling. Gastrointestinal:  Negative for abdominal pain, constipation, diarrhea, heartburn, nausea and vomiting. Genitourinary:  Negative for dysuria, frequency and urgency. Musculoskeletal:  Negative for joint pain and myalgias. Skin:  Negative for rash. Neurological: Negative. Negative for headaches. Endo/Heme/Allergies:  Does not bruise/bleed easily. Psychiatric/Behavioral:  Negative for memory loss and suicidal ideas. Grieving. Visit Vitals  BP (!) 133/94 (BP 1 Location: Left upper arm, BP Patient Position: Sitting, BP Cuff Size: Large adult)   Pulse 79   Temp 98.3 °F (36.8 °C) (Oral)   Resp 18   Ht 5' 10.5\" (1.791 m)   Wt 187 lb 6.4 oz (85 kg)   SpO2 98%   BMI 26.51 kg/m²       Physical Exam  Constitutional:       General: She is not in acute distress. HENT:      Head: Normocephalic and atraumatic. Mouth/Throat:      Pharynx: No oropharyngeal exudate. Eyes:      General: No scleral icterus. Conjunctiva/sclera: Conjunctivae normal.      Pupils: Pupils are equal, round, and reactive to light. Cardiovascular:      Rate and Rhythm: Normal rate. Rhythm irregular. Heart sounds: No murmur heard. No friction rub. No gallop. Pulmonary:      Effort: Pulmonary effort is normal. No respiratory distress. Breath sounds: Normal breath sounds. No wheezing. Abdominal:      General: Bowel sounds are normal. There is no distension. Palpations: Abdomen is soft. Tenderness: There is no guarding or rebound. Musculoskeletal:         General: Normal range of motion. Cervical back: Normal range of motion. Skin:     General: Skin is dry. Findings: No rash. Neurological:      Mental Status: She is alert. Cranial Nerves: No cranial nerve deficit. Current Outpatient Medications   Medication Sig    metoprolol succinate (TOPROL-XL) 100 mg tablet     losartan (COZAAR) 100 mg tablet Take 100 mg by mouth daily. apixaban (ELIQUIS) 5 mg tablet Take 5 mg by mouth two (2) times a day. No current facility-administered medications for this visit. 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 HX TONSILLECTOMY        Social History     Tobacco Use    Smoking status: Former     Packs/day: 0.00     Years: 8.00     Pack years: 0.00     Types: Cigarettes     Start date: 1972     Quit date: 1980     Years since quittin.9    Smokeless tobacco: Never   Substance Use Topics    Alcohol use: Not Currently      Family History   Problem Relation Age of Onset    Hypertension Mother     Alzheimer's Disease Mother     Prostate Cancer Father     Diabetes Father         Type 2    Hypertension Father     Cancer Father         Prostate    Diabetes Sister         typpe 1    Prostate Cancer Brother     Hypertension Brother     Alzheimer's Disease Brother     Cancer Brother         Prostate    Hypertension Brother     Diabetes Sister         type1        Allergies   Allergen Reactions    Amoxicillin Hives    Doxycycline Rash        Assessment/Plan  Diagnoses and all orders for this visit:    1. Encounter to establish care-reviewed past medical history. Screening tests are up-to-date. 2. Essential hypertension-very well controlled at home  -     [address]; Future  -     CBC WITH AUTOMATED DIFF; Future    3. Persistent atrial fibrillation (HCC)-rate controlled. On oral anticoagulation    4. Hyperlipidemia, unspecified hyperlipidemia type-very mild in the past  -     LIPID PANEL; Future    5.  Grieving-considering seeing therapist again. I encouraged her to do so          Dann Stevens MD  10/13/2022    This note was created with the help of speech recognition software Jenny Casanova) and may contain some 'sound alike' errors.

## 2022-10-13 NOTE — PROGRESS NOTES
Cheryl Roa  Identified pt with two pt identifiers(name and ). Chief Complaint   Patient presents with    [facility]     RM18// pt presenting today to [facility] was previously seeing Dr Rohit Hancock       1. Have you been to the ER, urgent care clinic since your last visit? Hospitalized since your last visit? NO    2. Have you seen or consulted any other health care providers outside of the [address] [place] [address] since your last visit? Include any pap smears or colon screening. NO      Provider notified of reason for visit, vitals and flowsheets obtained on patients.      Patient received paperwork for advance directive during previous visit but has not completed at this time     Reviewed record In preparation for visit, huddled with provider and have obtained necessary documentation      Health Maintenance Due   Topic    Colorectal Cancer Screening Combo        Wt Readings from Last 3 Encounters:   10/13/22 187 lb 6.4 oz (85 kg)   22 188 lb 9.6 oz (85.5 kg)   21 194 lb 6.4 oz (88.2 kg)     Temp Readings from Last 3 Encounters:   10/13/22 98.3 °F (36.8 °C) (Oral)   22 97.5 °F (36.4 °C)   21 97.5 °F (36.4 °C) (Temporal)     BP Readings from Last 3 Encounters:   10/13/22 (!) 133/94   22 (!) 160/80   21 (!) 170/90     Pulse Readings from Last 3 Encounters:   10/13/22 79   22 98   21 80     Vitals:    10/13/22 1416   BP: (!) 133/94   Pulse: 79   Resp: 18   Temp: 98.3 °F (36.8 °C)   TempSrc: Oral   SpO2: 98%   Weight: 187 lb 6.4 oz (85 kg)   Height: 5' 10.5\" (1.791 m)   PainSc:   0 - No pain         Learning Assessment:  :     Learning Assessment 2018   PRIMARY LEARNER Patient   HIGHEST LEVEL OF EDUCATION - PRIMARY LEARNER  4 YEARS OF COLLEGE   BARRIERS PRIMARY LEARNER NONE   PRIMARY LANGUAGE ENGLISH   LEARNER PREFERENCE PRIMARY LISTENING   ANSWERED BY pt   RELATIONSHIP SELF       Depression Screening:  :     3 most recent PHQ Screens 2022 Little interest or pleasure in doing things Not at all   Feeling down, depressed, irritable, or hopeless Not at all   Total Score PHQ 2 0       Fall Risk Assessment:  :     Fall Risk Assessment, last 12 mths 6/13/2022   Able to walk? Yes   Fall in past 12 months? 0   Do you feel unsteady? 0   Are you worried about falling 0   Is TUG test greater than 12 seconds? -   Is the gait abnormal? -   Number of falls in past 12 months -   Fall with injury? -       Abuse Screening:  :     Abuse Screening Questionnaire 6/13/2022 3/4/2019 1/8/2018   Do you ever feel afraid of your partner? N N N   Are you in a relationship with someone who physically or mentally threatens you? N N N   Is it safe for you to go home? Y Y Y       ADL Screening:  :     ADL Assessment 2/28/2018   Feeding yourself No Help Needed   Getting from bed to chair No Help Needed   Getting dressed No Help Needed   Bathing or showering No Help Needed   Walk across the room (includes cane/walker) No Help Needed   Using the telphone No Help Needed   Taking your medications No Help Needed   Preparing meals No Help Needed   Managing money (expenses/bills) No Help Needed   Moderately strenuous housework (laundry) No Help Needed   Shopping for personal items (toiletries/medicines) No Help Needed   Shopping for groceries No Help Needed   Driving No Help Needed   Climbing a flight of stairs No Help Needed   Getting to places beyond walking distances No Help Needed         Medication reconciliation up to date and corrected with patient at this time.

## 2023-01-30 ENCOUNTER — APPOINTMENT (OUTPATIENT)
Dept: ULTRASOUND IMAGING | Age: 73
End: 2023-01-30
Attending: STUDENT IN AN ORGANIZED HEALTH CARE EDUCATION/TRAINING PROGRAM
Payer: MEDICARE

## 2023-01-30 ENCOUNTER — HOSPITAL ENCOUNTER (EMERGENCY)
Age: 73
Discharge: HOME | End: 2023-01-30
Attending: STUDENT IN AN ORGANIZED HEALTH CARE EDUCATION/TRAINING PROGRAM
Payer: MEDICARE

## 2023-01-30 VITALS
TEMPERATURE: 97.7 F | DIASTOLIC BLOOD PRESSURE: 88 MMHG | WEIGHT: 183.64 LBS | OXYGEN SATURATION: 100 % | BODY MASS INDEX: 25.71 KG/M2 | HEART RATE: 80 BPM | RESPIRATION RATE: 18 BRPM | SYSTOLIC BLOOD PRESSURE: 152 MMHG | HEIGHT: 71 IN

## 2023-01-30 DIAGNOSIS — M25.571 RIGHT ANKLE PAIN, UNSPECIFIED CHRONICITY: Primary | ICD-10-CM

## 2023-01-30 PROCEDURE — 93971 EXTREMITY STUDY: CPT

## 2023-01-30 PROCEDURE — 99284 EMERGENCY DEPT VISIT MOD MDM: CPT

## 2023-01-30 RX ORDER — METHYLPREDNISOLONE 4 MG/1
1 TABLET ORAL DAILY
COMMUNITY
Start: 2023-01-30

## 2023-01-30 NOTE — DISCHARGE INSTRUCTIONS
You presented to ED with swelling of your right lower extremity and pain in your ankle. You were recently at [address] [place] [address] and had x-rays with no signs of fracture but significant signs of arthritis. Based on her swelling you were directed to the ED for DVT study. Ultrasound shows no blood clots in the lower extremity. Most likely this is continued arthritis and swelling. Recommend Tylenol 1000 mg every 6 hours, heat and/or ice whichever feels better, elevation and follow-up with continued management by orthopedic surgery.

## 2023-01-30 NOTE — ED TRIAGE NOTES
Patient with hx of ankle injury 25 years ago. Since December, the patient has experienced right ankle pain with right calf swelling. Pt referred to the ER by her cardiologist to tue out a blood clot. Patient with hx of A-fib and treated with Eliquis.

## 2023-01-31 ENCOUNTER — TELEPHONE (OUTPATIENT)
Dept: INTERNAL MEDICINE CLINIC | Age: 73
End: 2023-01-31

## 2023-01-31 NOTE — TELEPHONE ENCOUNTER
Patient's son is requesting to speak with the nurse to discuss patient's anxiety.    C/b [phone]

## 2023-01-31 NOTE — TELEPHONE ENCOUNTER
01/31/2023--This user called and spoke with the patient's son Thong Naylor (On patient's PHI form), he stated that his mom is having issues with anxiety and presents them in \"weird\" ways. I asked if patient had been on anything for this before and he stated that patient had told him that she was on something for \"stage fright\" that helps with her anxiety but he did not know the name of it and was not sure if she was even taking that (I also did not see anything in her meds or the outside meds). I asked if patient has seen a therapist and he stated that patient has a couple times in the past. I let him know that  would most likely want to see the patient so he can discuss options with her. I scheduled patient for this Friday the 3rd at 1:00 pm. Patient's son verbalized understanding and had no further question's or concerns at that time.

## 2023-02-01 NOTE — ED PROVIDER NOTES
Patient is a 77-year-old female presented the ED with right ankle and right calf swelling. Symptoms have been present since December but now are worsening. Patient does have a history of ankle fracture and surgery 25 years ago. Patient is on Eliquis for A. fib. Patient sent to the ED by her cardiologist to rule out DVT. Patient's vital signs are stable. Patient is afebrile. Patient denies chest pain, shortness of breath, erythema, nausea, vomiting. The history is provided by the patient and a relative. 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      HX TONSILLECTOMY           Family History:   Problem Relation Age of Onset    Hypertension Mother     Alzheimer's Disease Mother     Prostate Cancer Father     Diabetes Father         Type 2    Hypertension Father     Cancer Father         Prostate    Diabetes Sister         typpe 1    Prostate Cancer Brother     Hypertension Brother     Alzheimer's Disease Brother     Cancer Brother         Prostate    Hypertension Brother     Diabetes Sister         type1       Social History     Socioeconomic History    Marital status:      Spouse name: Not on file    Number of children: Not on file    Years of education: Not on file    Highest education level: Not on file   Occupational History    Not on file   Tobacco Use    Smoking status: Former     Packs/day: 0.00     Years: 8.00     Pack years: 0.00     Types: Cigarettes     Start date: 1972     Quit date: 1980     Years since quittin.2    Smokeless tobacco: Never   Vaping Use    Vaping Use: Never used   Substance and Sexual Activity    Alcohol use: Not Currently    Drug use: Never    Sexual activity: Not Currently     Partners: Male     Birth control/protection: Diaphragm, Spermicide   Other Topics Concern    Not on file   Social History Narrative    Not on file     Social Determinants of Health     Financial Resource Strain: Not on file   Food Insecurity: Not on file   Transportation Needs: Not on file   Physical Activity: Insufficiently Active    Days of Exercise per Week: 2 days    Minutes of Exercise per Session: 20 min   Stress: Not on file   Social Connections: Not on file   Intimate Partner Violence: Not At Risk    Fear of Current or Ex-Partner: No    Emotionally Abused: No    Physically Abused: No    Sexually Abused: No   Housing Stability: Not on file         ALLERGIES: Amoxicillin and Doxycycline    Review of Systems   Constitutional:  Negative for activity change, appetite change, fatigue and fever. Respiratory:  Negative for shortness of breath. Cardiovascular:  Positive for leg swelling. Negative for chest pain. Musculoskeletal:  Positive for arthralgias and myalgias. Vitals:    01/30/23 1144 01/30/23 1234 01/30/23 1304   BP: (!) 150/102 (!) 155/90 (!) 152/88   Pulse: 80     Resp: 20 18    Temp: 97.7 °F (36.5 °C)     SpO2: 100% 99% 100%   Weight: 83.3 kg (183 lb 10.3 oz)     Height: 5' 11\" (1.803 m)              Physical Exam  Vitals and nursing note reviewed. Constitutional:       Appearance: Normal appearance. HENT:      Head: Normocephalic and atraumatic. Right Ear: External ear normal.      Left Ear: External ear normal.   Eyes:      Conjunctiva/sclera: Conjunctivae normal.   Cardiovascular:      Rate and Rhythm: Normal rate and regular rhythm. Pulses: Normal pulses. Heart sounds: Normal heart sounds. Pulmonary:      Effort: Pulmonary effort is normal.      Breath sounds: Normal breath sounds. Chest:      Chest wall: No deformity or tenderness. Abdominal:      Palpations: Abdomen is soft. Tenderness: There is no abdominal tenderness. Musculoskeletal:         General: Swelling and tenderness present.  No deformity or signs of injury. Normal range of motion. Comments: Patient has mild tenderness over the right ankle and right lower leg. No erythema or fluctuance. Skin:     General: Skin is warm and dry. Coloration: Skin is not jaundiced. Neurological:      General: No focal deficit present. Mental Status: She is alert and oriented to person, place, and time. Psychiatric:         Mood and Affect: Mood normal.         Behavior: Behavior normal.        Medical Decision Making  Amount and/or Complexity of Data Reviewed  Radiology: ordered. ECG/medicine tests: ordered. Risk  OTC drugs. IMAGING RESULTS:  DUPLEX LOWER EXT VENOUS RIGHT   Final Result          MEDICATIONS GIVEN:  Medications - No data to display    Differential diagnosis: DVT, leg swelling, arthritis    MDM: Patient is a 77-year-old female presented ED with right ankle and lower extremity swelling and pain with cardiology concern for DVT. Ultrasound obtained, no signs of DVT. Most likely patient is experiencing arthritis symptoms suitable for outpatient management and symptomatic management. Patient is ambulatory and has no trauma. Patient states she does not take any additional medications than what is prescribed for her because of fear of interactions with her A. fib medications. She is stable for Tylenol was encouraged to take Tylenol for pain and use heat and ice as well as elevation as needed. Further personalized recommendations for outpatient care as below. Key Discharge Instructions and summary of care: You presented to ED with swelling of your right lower extremity and pain in your ankle. You were recently at [address] [place] [address] and had x-rays with no signs of fracture but significant signs of arthritis. Based on her swelling you were directed to the ED for DVT study. Ultrasound shows no blood clots in the lower extremity. Most likely this is continued arthritis and swelling.   Recommend Tylenol 1000 mg every 6 hours, heat and/or ice whichever feels better, elevation and follow-up with continued management by orthopedic surgery. The patient has been re-evaluated and feeling better. Patient is stable for discharge. All available radiology and laboratory results have been reviewed with patient and/or available family. Patient and/or family verbally conveyed their understanding and agreement of the patient's signs, symptoms, diagnosis, treatment and prognosis and additionally agree to follow-up as recommended in the discharge instructions or to return to the Emergency Department should their condition change or worsen prior to their follow-up appointment. All questions have been answered and patient and/or available family express understanding. ED Impression:    ICD-10-CM ICD-9-CM    1. Right ankle pain, unspecified chronicity  M25.571 719.47            DISPOSITION: Discharged    Bryon Matos MD          Procedures

## 2023-02-03 ENCOUNTER — OFFICE VISIT (OUTPATIENT)
Dept: INTERNAL MEDICINE CLINIC | Age: 73
End: 2023-02-03
Payer: MEDICARE

## 2023-02-03 VITALS
BODY MASS INDEX: 25.34 KG/M2 | SYSTOLIC BLOOD PRESSURE: 134 MMHG | TEMPERATURE: 97 F | OXYGEN SATURATION: 98 % | RESPIRATION RATE: 18 BRPM | HEART RATE: 83 BPM | WEIGHT: 181 LBS | HEIGHT: 71 IN | DIASTOLIC BLOOD PRESSURE: 86 MMHG

## 2023-02-03 DIAGNOSIS — I48.19 PERSISTENT ATRIAL FIBRILLATION (HCC): ICD-10-CM

## 2023-02-03 DIAGNOSIS — F43.21 GRIEVING: ICD-10-CM

## 2023-02-03 DIAGNOSIS — F32.A ANXIETY AND DEPRESSION: Primary | ICD-10-CM

## 2023-02-03 DIAGNOSIS — Z13.31 POSITIVE DEPRESSION SCREENING: ICD-10-CM

## 2023-02-03 DIAGNOSIS — I10 ESSENTIAL HYPERTENSION: ICD-10-CM

## 2023-02-03 DIAGNOSIS — F41.9 ANXIETY AND DEPRESSION: Primary | ICD-10-CM

## 2023-02-03 RX ORDER — SERTRALINE HYDROCHLORIDE 25 MG/1
25 TABLET, FILM COATED ORAL DAILY
Qty: 30 TABLET | Refills: 2 | Status: SHIPPED | OUTPATIENT
Start: 2023-02-03

## 2023-02-03 NOTE — PROGRESS NOTES
Yajaira Arnett is a 74 y.o. female who presents today for Anxiety and Depression  . She has a history of   Patient Active Problem List   Diagnosis Code    Persistent [facility] ([facility] I48.19    Essential hypertension I10    Age-related cataract H25.9   . Today patient is here for an acute visit. Here with her son to discuss mental health    Anxiety: has been much higher. Previously did see a therapist for a while after her  was placed in memory care. She does report that she has been a quite anxious person, but recently has been affecting her quality of life. She believes that she has been holding some things from her children which is bothersome for her. She denies any loc depressive type symptoms, but she often times feels low. Does plan on going back again and getting in with a therapist.  She otherwise remains quite physically active. She does report that the beta-blocker seem to help some anxiety in the past.  Does have some concern over short-term memory loss. He does have some family history of dementia    ER visit on 1/30 due to leg pain. This was to r/o DVT. Reports that right ankle has always been more swollen since an injury years ago, but had progressively gotten worse over the holidays. The day she presented to the ER her calf was starting to hurt. A. fib: Patient remains on a beta-blocker as well as Eliquis. Hypertension: Blood pressure much better on repeat. ROS  Review of Systems   Constitutional:  Negative for chills, fever and weight loss. HENT:  Negative for congestion and sore throat. Eyes:  Negative for blurred vision, double vision and photophobia. Respiratory:  Negative for cough and shortness of breath. Cardiovascular:  Positive for leg swelling. Negative for chest pain and palpitations. Gastrointestinal:  Negative for abdominal pain, constipation, diarrhea, heartburn, nausea and vomiting.    Genitourinary:  Negative for dysuria, frequency and urgency. Musculoskeletal:  Negative for joint pain and myalgias. Skin:  Negative for rash. Neurological: Negative. Negative for headaches. Endo/Heme/Allergies:  Does not bruise/bleed easily. Psychiatric/Behavioral:  Positive for depression. Negative for memory loss and suicidal ideas. The patient is nervous/anxious. Visit Vitals  /86   Pulse 83   Temp 97 °F (36.1 °C) (Oral)   Resp 18   Ht 5' 11\" (1.803 m)   Wt 181 lb (82.1 kg)   SpO2 98%   BMI 25.24 kg/m²       Physical Exam  Constitutional:       Appearance: She is well-developed. HENT:      Head: Normocephalic and atraumatic. Cardiovascular:      Rate and Rhythm: Normal rate and regular rhythm. Heart sounds: No murmur heard. Pulmonary:      Effort: Pulmonary effort is normal. No respiratory distress. Musculoskeletal:      Comments: Some mild swelling of right ankle. No warmth. Skin:     General: Skin is warm and dry. Neurological:      Mental Status: She is alert and oriented to person, place, and time. Psychiatric:         Behavior: Behavior normal.      Comments: Mood low at times and tearful at times. Current Outpatient Medications   Medication Sig    sertraline (ZOLOFT) 25 mg tablet Take 1 Tablet by mouth daily. methylPREDNISolone (MEDROL DOSEPACK) 4 mg tablet Take 1 Tablet by mouth daily. metoprolol succinate (TOPROL-XL) 100 mg tablet     losartan (COZAAR) 100 mg tablet Take 100 mg by mouth daily. apixaban (ELIQUIS) 5 mg tablet Take 5 mg by mouth two (2) times a day. No current facility-administered medications for this visit. 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      HX TONSILLECTOMY        Social History     Tobacco Use    Smoking status: Former     Packs/day: 0.00     Years: 8.00     Pack years: 0.00     Types: Cigarettes     Start date: 1972     Quit date: 1980     Years since quittin.2    Smokeless tobacco: Never   Substance Use Topics    Alcohol use: Not Currently      Family History   Problem Relation Age of Onset    Hypertension Mother     Alzheimer's Disease Mother     Prostate Cancer Father     Diabetes Father         Type 2    Hypertension Father     Cancer Father         Prostate    Diabetes Sister         typpe 1    Prostate Cancer Brother     Hypertension Brother     Alzheimer's Disease Brother     Cancer Brother         Prostate    Hypertension Brother     Diabetes Sister         type1        Allergies   Allergen Reactions    Amoxicillin Hives    Doxycycline Rash     Other reaction(s): unspecified  Othe          Assessment/Plan  Diagnoses and all orders for this visit:    1. Anxiety and depression-patient has been through a lot and is unfortunately still going through a lot with a grieving process. We discussed that given the advanced dementia that her  has, it is as though she is already lost him. She does report that she is an anxious person, but her mood has been lower recently. We discussed pharmacotherapy as well as getting back in with a therapist to work through some of her emotions. Patient is encouraged that she can start to feel better. We will follow-up with her in 4 to 6 weeks. We had a long discussion of how medications work and what to expect with them. Patient and patient's son both agree. Some concern over short-term memory loss. Believe it is a good idea to get a baseline neuropsych test  -     REFERRAL TO NEUROPSYCHOLOGY  -     sertraline (ZOLOFT) 25 mg tablet; Take 1 Tablet by mouth daily. 2. Grieving    3. Persistent atrial fibrillation (HCC)-on beta-blockers and oral anticoagulation    4. Positive depression screening    5. HTN - BP much better on repeat. Following with cards.            Christy Stevenson, MD  2/3/2023    This note was created with the help of speech recognition software (Dragon) and may contain some 'sound alike' errors.

## 2023-02-03 NOTE — PROGRESS NOTES
Room: 21  Identified pt with two pt identifiers(name and ). Reviewed record in preparation for visit and have obtained necessary documentation. Chief Complaint   Patient presents with    Anxiety    Depression        Vitals:    23 1255   BP: (!) 169/92   Pulse: 83   Resp: 18   Temp: 97 °F (36.1 °C)   TempSrc: Oral   SpO2: 98%   Weight: 181 lb (82.1 kg)   Height: 5' 11\" (1.803 m)   PainSc:   0 - No pain       Health Maintenance Due   Topic    Colorectal Cancer Screening Combo     Breast Cancer Screen Mammogram        1. \"Have you been to the ER, urgent care clinic since your last visit? Hospitalized since your last visit? \" No    2. \"Have you seen or consulted any other health care providers outside of the [address] [place] [address] since your last visit? \" No     3. For patients over 45: Has the patient had a colonoscopy? No     If the patient is female:    4. For patients over 40: Has the patient had a mammogram? Yes - no Care Gap present    5. For patients over 21: Has the patient had a pap smear? NA - based on age    Current Outpatient Medications   Medication Instructions    apixaban (ELIQUIS) 5 mg, Oral, 2 TIMES DAILY    losartan (COZAAR) 100 mg, Oral, DAILY    methylPREDNISolone (MEDROL DOSEPACK) 4 mg tablet 1 Tablet, Oral, DAILY    metoprolol succinate (TOPROL-XL) 100 mg tablet No dose, route, or frequency recorded.        Allergies   Allergen Reactions    Amoxicillin Hives    Doxycycline Rash     Other reaction(s): unspecified  Othe         Immunization History   Administered Date(s) Administered    COVID-19, MODERNA BLUE border, Primary or Immunocompromised, (age 18y+), IM, 100 mcg/0.5mL 2021, 2021, 10/30/2021, 2022    COVID-19, MODERNA Bivalent BOOSTER, (age 18y+), IM, 50 mcg/0.5 mL 2022    Influenza High Dose Vaccine PF 2022    Influenza Vaccine 10/08/2020, 2021    Pneumococcal Conjugate (PCV-13) 2015    Pneumococcal Polysaccharide (PPSV-23) 2016 Zoster Recombinant 02/29/2020, 06/07/2020    Zoster Vaccine, Live 05/29/2015       Past Medical History:   Diagnosis Date    Arrhythmia     Hypertension

## 2023-02-10 ENCOUNTER — TELEPHONE (OUTPATIENT)
Dept: INTERNAL MEDICINE CLINIC | Age: 73
End: 2023-02-10

## 2023-02-10 NOTE — TELEPHONE ENCOUNTER
Patient would like a call back from the nurse to discuss her handicap parking pass. Patient stated her application was incomplete. Patient also stated she picked up someone else's parking pass application. Patient would like a call back from the nurse to discuss what to do next.

## 2023-02-10 NOTE — TELEPHONE ENCOUNTER
02/10/2023--This user called and spoke with the patient and explained everything to her and she verbalized understanding and all question's were answered at that time.

## 2023-03-15 ENCOUNTER — OFFICE VISIT (OUTPATIENT)
Dept: INTERNAL MEDICINE CLINIC | Age: 73
End: 2023-03-15
Payer: MEDICARE

## 2023-03-15 VITALS
OXYGEN SATURATION: 98 % | BODY MASS INDEX: 25.06 KG/M2 | DIASTOLIC BLOOD PRESSURE: 84 MMHG | RESPIRATION RATE: 16 BRPM | SYSTOLIC BLOOD PRESSURE: 128 MMHG | HEART RATE: 98 BPM | HEIGHT: 71 IN | WEIGHT: 179 LBS | TEMPERATURE: 99.7 F

## 2023-03-15 DIAGNOSIS — F41.9 ANXIETY AND DEPRESSION: Primary | ICD-10-CM

## 2023-03-15 DIAGNOSIS — F32.A ANXIETY AND DEPRESSION: Primary | ICD-10-CM

## 2023-03-15 DIAGNOSIS — U07.1 COVID-19: ICD-10-CM

## 2023-03-15 DIAGNOSIS — R09.81 CONGESTION OF NASAL SINUS: ICD-10-CM

## 2023-03-15 DIAGNOSIS — R05.9 COUGH, UNSPECIFIED TYPE: ICD-10-CM

## 2023-03-15 DIAGNOSIS — I10 ESSENTIAL HYPERTENSION: ICD-10-CM

## 2023-03-15 LAB
EXP DATE SOLUTION: ABNORMAL
EXP DATE SWAB: ABNORMAL
LOT NUMBER SOLUTION: ABNORMAL
LOT NUMBER SWAB: ABNORMAL
SARS-COV-2 RNA POC: POSITIVE

## 2023-03-15 PROCEDURE — G9899 SCRN MAM PERF RSLTS DOC: HCPCS | Performed by: INTERNAL MEDICINE

## 2023-03-15 PROCEDURE — G8432 DEP SCR NOT DOC, RNG: HCPCS | Performed by: INTERNAL MEDICINE

## 2023-03-15 PROCEDURE — 1101F PT FALLS ASSESS-DOCD LE1/YR: CPT | Performed by: INTERNAL MEDICINE

## 2023-03-15 PROCEDURE — G8399 PT W/DXA RESULTS DOCUMENT: HCPCS | Performed by: INTERNAL MEDICINE

## 2023-03-15 PROCEDURE — G8427 DOCREV CUR MEDS BY ELIG CLIN: HCPCS | Performed by: INTERNAL MEDICINE

## 2023-03-15 PROCEDURE — 99214 OFFICE O/P EST MOD 30 MIN: CPT | Performed by: INTERNAL MEDICINE

## 2023-03-15 PROCEDURE — G0463 HOSPITAL OUTPT CLINIC VISIT: HCPCS | Performed by: INTERNAL MEDICINE

## 2023-03-15 PROCEDURE — G8536 NO DOC ELDER MAL SCRN: HCPCS | Performed by: INTERNAL MEDICINE

## 2023-03-15 PROCEDURE — G8420 CALC BMI NORM PARAMETERS: HCPCS | Performed by: INTERNAL MEDICINE

## 2023-03-15 PROCEDURE — 1090F PRES/ABSN URINE INCON ASSESS: CPT | Performed by: INTERNAL MEDICINE

## 2023-03-15 PROCEDURE — 3017F COLORECTAL CA SCREEN DOC REV: CPT | Performed by: INTERNAL MEDICINE

## 2023-03-15 PROCEDURE — 87635 SARS-COV-2 COVID-19 AMP PRB: CPT | Performed by: INTERNAL MEDICINE

## 2023-03-15 RX ORDER — SERTRALINE HYDROCHLORIDE 25 MG/1
25 TABLET, FILM COATED ORAL DAILY
Qty: 90 TABLET | Refills: 2 | Status: SHIPPED | OUTPATIENT
Start: 2023-03-15

## 2023-03-15 NOTE — PROGRESS NOTES
Cheryl Roa  Identified pt with two pt identifiers(name and ). Chief Complaint   Patient presents with    Follow-up     RM19// pt presenting today following up to starting medication        1. Have you been to the ER, urgent care clinic since your last visit? Hospitalized since your last visit? NO    2. Have you seen or consulted any other health care providers outside of the [address] [place] [address] since your last visit? Include any pap smears or colon screening. NO      Provider notified of reason for visit, vitals and flowsheets obtained on patients.      Patient received paperwork for advance directive during previous visit but has not completed at this time     Reviewed record In preparation for visit, huddled with provider and have obtained necessary documentation      Health Maintenance Due   Topic    Colorectal Cancer Screening Combo     Breast Cancer Screen Mammogram        Wt Readings from Last 3 Encounters:   03/15/23 179 lb (81.2 kg)   23 181 lb (82.1 kg)   23 183 lb 10.3 oz (83.3 kg)     Temp Readings from Last 3 Encounters:   03/15/23 99.7 °F (37.6 °C) (Oral)   23 97 °F (36.1 °C) (Oral)   23 97.7 °F (36.5 °C)     BP Readings from Last 3 Encounters:   03/15/23 (!) 145/84   23 134/86   23 (!) 152/88     Pulse Readings from Last 3 Encounters:   03/15/23 98   23 83   23 80     Vitals:    03/15/23 0914   BP: (!) 145/84   Pulse: 98   Resp: 16   Temp: 99.7 °F (37.6 °C)   TempSrc: Oral   SpO2: 98%   Weight: 179 lb (81.2 kg)   Height: 5' 11\" (1.803 m)         Learning Assessment:  :     Learning Assessment 2018   PRIMARY LEARNER Patient   HIGHEST LEVEL OF EDUCATION - PRIMARY LEARNER  4 YEARS OF COLLEGE   BARRIERS PRIMARY LEARNER NONE   PRIMARY LANGUAGE ENGLISH   LEARNER PREFERENCE PRIMARY LISTENING   ANSWERED BY pt   RELATIONSHIP SELF       Depression Screening:  :     3 most recent PHQ Screens 2/3/2023   Little interest or pleasure in doing things Nearly every day   Feeling down, depressed, irritable, or hopeless Nearly every day   Total Score PHQ 2 6   Trouble falling or staying asleep, or sleeping too much More than half the days   Feeling tired or having little energy Several days   Poor appetite, weight loss, or overeating Not at all   Feeling bad about yourself - or that you are a failure or have let yourself or your family down Not at all   Trouble concentrating on things such as school, work, reading, or watching TV Not at all   Moving or speaking so slowly that other people could have noticed; or the opposite being so fidgety that others notice More than half the days   Thoughts of being better off dead, or hurting yourself in some way Not at all   PHQ 9 Score 11   How difficult have these problems made it for you to do your work, take care of your home and get along with others Extremely difficult       Fall Risk Assessment:  :     Fall Risk Assessment, last 12 mths 2/3/2023   Able to walk? Yes   Fall in past 12 months? 0   Do you feel unsteady? 0   Are you worried about falling 0   Is TUG test greater than 12 seconds? -   Is the gait abnormal? -   Number of falls in past 12 months -   Fall with injury? -       Abuse Screening:  :     Abuse Screening Questionnaire 6/13/2022 3/4/2019 1/8/2018   Do you ever feel afraid of your partner? N N N   Are you in a relationship with someone who physically or mentally threatens you? N N N   Is it safe for you to go home?  Y Y Y       ADL Screening:  :     ADL Assessment 2/28/2018   Feeding yourself No Help Needed   Getting from bed to chair No Help Needed   Getting dressed No Help Needed   Bathing or showering No Help Needed   Walk across the room (includes cane/walker) No Help Needed   Using the telphone No Help Needed   Taking your medications No Help Needed   Preparing meals No Help Needed   Managing money (expenses/bills) No Help Needed   Moderately strenuous housework (laundry) No Help Needed   Shopping for personal items (toiletries/medicines) No Help Needed   Shopping for groceries No Help Needed   Driving No Help Needed   Climbing a flight of stairs No Help Needed   Getting to places beyond walking distances No Help Needed         Medication reconciliation up to date and corrected with patient at this time.

## 2023-03-15 NOTE — PROGRESS NOTES
Supriya Morales is a 74 y.o. female who presents today for Follow-up (RM19// pt presenting today following up to starting medication )  . She has a history of   Patient Active Problem List   Diagnosis Code    [facility] I48.19    Essential hypertension I10    Age-related cataract H25.9   . Today patient is here for follow . she does not have other concerns. Having URI symptoms for about 3 days. Some fatigue and low grade fevers. Sore throat. Anxiety: had been much higher. Has started with therapist. S/e have resolved. Anxiety much better. Moving in the right direction. Feeling much better. Hypertension  Hypertension ROS: taking medications as instructed, no medication side effects noted, no TIA's, no chest pain on exertion, no dyspnea on exertion, no swelling of ankles     reports that she quit smoking about 42 years ago. Her smoking use included cigarettes. She started smoking about 51 years ago. She has never used smokeless tobacco.    reports that she does not currently use alcohol. BP Readings from Last 2 Encounters:   03/15/23 128/84   02/03/23 134/86         ROS  Review of Systems   Constitutional:  Negative for chills, fever and weight loss. HENT:  Positive for congestion and sore throat. Negative for hearing loss and tinnitus. Eyes:  Negative for blurred vision, double vision and photophobia. Respiratory:  Negative for cough, hemoptysis, sputum production and shortness of breath. Cardiovascular:  Negative for chest pain, palpitations and leg swelling. Gastrointestinal:  Negative for abdominal pain, constipation, diarrhea, heartburn, nausea and vomiting. Genitourinary:  Negative for dysuria, frequency and urgency. Musculoskeletal:  Negative for joint pain and myalgias. Skin:  Negative for rash. Neurological: Negative. Negative for headaches. Endo/Heme/Allergies:  Does not bruise/bleed easily.    Psychiatric/Behavioral:  Negative for memory loss and suicidal ideas. Visit Vitals  /84   Pulse 98   Temp 99.7 °F (37.6 °C) (Oral)   Resp 16   Ht 5' 11\" (1.803 m)   Wt 179 lb (81.2 kg)   SpO2 98%   BMI 24.97 kg/m²       Physical Exam  Constitutional:       General: She is not in acute distress. Appearance: She is well-developed. HENT:      Head: Normocephalic and atraumatic. Right Ear: Tympanic membrane normal.      Left Ear: Tympanic membrane normal.   Neck:      Thyroid: No thyromegaly. Cardiovascular:      Rate and Rhythm: Normal rate and regular rhythm. Heart sounds: No murmur heard. Pulmonary:      Effort: Pulmonary effort is normal.      Breath sounds: Normal breath sounds. No wheezing. Abdominal:      General: Bowel sounds are normal. There is no distension. Palpations: Abdomen is soft. Musculoskeletal:      Cervical back: Normal range of motion and neck supple. Skin:     General: Skin is warm and dry. Neurological:      Mental Status: She is alert and oriented to person, place, and time. Cranial Nerves: No cranial nerve deficit. Psychiatric:         Behavior: Behavior normal.         Current Outpatient Medications   Medication Sig    sertraline (ZOLOFT) 25 mg tablet Take 1 Tablet by mouth daily. metoprolol succinate (TOPROL-XL) 100 mg tablet     losartan (COZAAR) 100 mg tablet Take 100 mg by mouth daily. apixaban (ELIQUIS) 5 mg tablet Take 5 mg by mouth two (2) times a day. No current facility-administered medications for this visit. 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      HX TONSILLECTOMY        Social History     Tobacco Use    Smoking status: Former     Packs/day: 0.00     Years: 8.00     Pack years: 0.00     Types: Cigarettes     Start date: 1/8/1972 Quit date: 1980     Years since quittin.3    Smokeless tobacco: Never   Substance Use Topics    Alcohol use: Not Currently      Family History   Problem Relation Age of Onset    Hypertension Mother     Alzheimer's Disease Mother     Prostate Cancer Father     Diabetes Father         Type 2    Hypertension Father     Cancer Father         Prostate    Diabetes Sister         typpe 1    Prostate Cancer Brother     Hypertension Brother     Alzheimer's Disease Brother     Cancer Brother         Prostate    Hypertension Brother     Diabetes Sister         type1        Allergies   Allergen Reactions    Amoxicillin Hives    Doxycycline Rash     Other reaction(s): unspecified  Othe          Assessment/Plan  Diagnoses and all orders for this visit:    1. Anxiety and depression-patient is now meeting with a therapist as well as doing physical therapy. Also has benefited from Zoloft. We will continue current dose. If she needs an increase in her dose in the next couple months she will let us know. -     sertraline (ZOLOFT) 25 mg tablet; Take 1 Tablet by mouth daily. 2. Congestion of nasal sinus-patient is positive for COVID. Given minimal symptoms will monitor and treat symptomatically. -     AMB POC COVID-19 COV    3. Cough, unspecified type  -     AMB POC COVID-19 COV    4. Essential hypertension-Home levels are good    5. Magno Shaw MD  3/15/2023    This note was created with the help of speech recognition software [facility]) and may contain some 'sound alike' errors.

## 2025-06-04 ENCOUNTER — APPOINTMENT (OUTPATIENT)
Facility: HOSPITAL | Age: 75
End: 2025-06-04
Payer: MEDICARE

## 2025-06-04 ENCOUNTER — HOSPITAL ENCOUNTER (EMERGENCY)
Facility: HOSPITAL | Age: 75
Discharge: HOME | End: 2025-06-04
Attending: EMERGENCY MEDICINE
Payer: MEDICARE

## 2025-06-04 VITALS
BODY MASS INDEX: 27.86 KG/M2 | SYSTOLIC BLOOD PRESSURE: 196 MMHG | DIASTOLIC BLOOD PRESSURE: 108 MMHG | WEIGHT: 199 LBS | OXYGEN SATURATION: 97 % | TEMPERATURE: 97.8 F | HEART RATE: 78 BPM | RESPIRATION RATE: 18 BRPM | HEIGHT: 71 IN
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
## 2025-06-04 DIAGNOSIS — S00.03XA CONTUSION OF SCALP, INITIAL ENCOUNTER: ICD-10-CM

## 2025-06-04 PROCEDURE — 99284 EMERGENCY DEPT VISIT MOD MDM: CPT

## 2025-06-04 PROCEDURE — 70450 CT HEAD/BRAIN W/O DYE: CPT

## 2025-06-04 RX ORDER — MULTIVIT-MIN/IRON/FOLIC ACID/K 18-600-40
2000 CAPSULE ORAL DAILY
COMMUNITY

## 2025-06-04 ASSESSMENT — PAIN - FUNCTIONAL ASSESSMENT: PAIN_FUNCTIONAL_ASSESSMENT: NONE - DENIES PAIN

## 2025-06-04 NOTE — ED PROVIDER NOTES
RESULTS     EKG: All EKG's are interpreted by the Emergency Department Physician who either signs or Co-signs this chart in the absence of a cardiologist.        RADIOLOGY:   Non-plain film images such as CT, Ultrasound and MRI are read by the radiologist. Plain radiographic images are visualized and preliminarily interpreted by the emergency physician with the below findings:        Interpretation per the Radiologist below, if available at the time of this note:    CT HEAD WO CONTRAST   Final Result   Interval but chronic appearing encephalomalacia in the inferior lateral left   temporal lobe. Otherwise no acute abnormality            Electronically signed by Yokasta Ricketts           LABS:  Labs Reviewed - No data to display    All other labs were within normal range or not returned as of this dictation.    EMERGENCY DEPARTMENT COURSE and DIFFERENTIAL DIAGNOSIS/MDM:   Vitals:    Vitals:    06/04/25 0944 06/04/25 0945   BP: (!) 218/110    Pulse: 78    Resp: 18    Temp:  97.8 °F (36.6 °C)   TempSrc:  Oral   SpO2: 98%    Weight: 90.3 kg (199 lb)    Height: 1.803 m (5' 11\")            Medical Decision Making  74 year old female with history of atrial fibrillation on anticoagulation presents after witnessed ground level fall. She hit her left shoulder and left forehead on a table during fall. No LOC. Neurologic exam intact. No pain and good range of motion of left shoulder. She denies any pain. Obtain Head CT.     Amount and/or Complexity of Data Reviewed  Radiology: ordered. Decision-making details documented in ED Course.            REASSESSMENT     ED Course as of 06/04/25 1041   Wed Jun 04, 2025   1035 CT HEAD WO CONTRAST  IMPRESSION:  Interval but chronic appearing encephalomalacia in the inferior lateral left  temporal lobe. Otherwise no acute abnormality   [CY]   1041 Imaging results discussed with patient. Stable for discharge home.  [CY]      ED Course User Index  [CY] Amber Ding DO

## 2025-06-04 NOTE — ED TRIAGE NOTES
[facility] 15. Patient ambulatory from stretcher to stretcher.  Patient A&O x4  Patient states she was taking a walk around her facility and thinking to herself everything she needs to get done today.  Patient states her shoe stuck to something and she fell hitting her head and left shoulder on a desk. Patient has no complaints.  Fall was witnessed. No LOC. Patient does take Eliquis for chronic Afib.